# Patient Record
Sex: MALE | Race: WHITE | NOT HISPANIC OR LATINO | URBAN - METROPOLITAN AREA
[De-identification: names, ages, dates, MRNs, and addresses within clinical notes are randomized per-mention and may not be internally consistent; named-entity substitution may affect disease eponyms.]

---

## 2021-03-03 DIAGNOSIS — Z23 NEED FOR VACCINATION: ICD-10-CM

## 2021-11-10 ENCOUNTER — HOSPITAL ENCOUNTER (INPATIENT)
Facility: MEDICAL CENTER | Age: 66
LOS: 4 days | DRG: 871 | End: 2021-11-14
Attending: EMERGENCY MEDICINE | Admitting: HOSPITALIST
Payer: COMMERCIAL

## 2021-11-10 ENCOUNTER — APPOINTMENT (OUTPATIENT)
Dept: RADIOLOGY | Facility: MEDICAL CENTER | Age: 66
DRG: 871 | End: 2021-11-10
Attending: EMERGENCY MEDICINE
Payer: COMMERCIAL

## 2021-11-10 ENCOUNTER — APPOINTMENT (OUTPATIENT)
Dept: RADIOLOGY | Facility: MEDICAL CENTER | Age: 66
DRG: 871 | End: 2021-11-10
Attending: STUDENT IN AN ORGANIZED HEALTH CARE EDUCATION/TRAINING PROGRAM
Payer: COMMERCIAL

## 2021-11-10 DIAGNOSIS — E87.20 LACTIC ACIDOSIS: ICD-10-CM

## 2021-11-10 DIAGNOSIS — A41.9 SEPSIS, DUE TO UNSPECIFIED ORGANISM, UNSPECIFIED WHETHER ACUTE ORGAN DYSFUNCTION PRESENT (HCC): ICD-10-CM

## 2021-11-10 DIAGNOSIS — R79.89 ELEVATED TROPONIN: ICD-10-CM

## 2021-11-10 DIAGNOSIS — I45.10 RBBB: ICD-10-CM

## 2021-11-10 DIAGNOSIS — R73.9 HYPERGLYCEMIA: ICD-10-CM

## 2021-11-10 DIAGNOSIS — R00.0 TACHYCARDIA: ICD-10-CM

## 2021-11-10 PROBLEM — E11.65 TYPE 2 DIABETES MELLITUS WITH HYPERGLYCEMIA, WITHOUT LONG-TERM CURRENT USE OF INSULIN (HCC): Status: ACTIVE | Noted: 2021-11-10

## 2021-11-10 PROBLEM — E27.8 ADRENAL CYST (HCC): Status: ACTIVE | Noted: 2021-11-10

## 2021-11-10 PROBLEM — J44.9 COPD (CHRONIC OBSTRUCTIVE PULMONARY DISEASE) (HCC): Status: ACTIVE | Noted: 2021-11-10

## 2021-11-10 PROBLEM — F17.200 TOBACCO USE DISORDER: Status: ACTIVE | Noted: 2021-11-10

## 2021-11-10 PROBLEM — N28.1 RENAL CYST: Status: ACTIVE | Noted: 2021-11-10

## 2021-11-10 PROBLEM — J96.00 ACUTE RESPIRATORY FAILURE (HCC): Status: ACTIVE | Noted: 2021-11-10

## 2021-11-10 LAB
ALBUMIN SERPL BCP-MCNC: 4.5 G/DL (ref 3.2–4.9)
ALBUMIN/GLOB SERPL: 1.8 G/DL
ALP SERPL-CCNC: 89 U/L (ref 30–99)
ALT SERPL-CCNC: 27 U/L (ref 2–50)
ANION GAP SERPL CALC-SCNC: 18 MMOL/L (ref 7–16)
APPEARANCE UR: CLEAR
AST SERPL-CCNC: 20 U/L (ref 12–45)
BACTERIA #/AREA URNS HPF: ABNORMAL /HPF
BASOPHILS # BLD AUTO: 0.3 % (ref 0–1.8)
BASOPHILS # BLD: 0.08 K/UL (ref 0–0.12)
BILIRUB SERPL-MCNC: 0.8 MG/DL (ref 0.1–1.5)
BILIRUB UR QL STRIP.AUTO: NEGATIVE
BUN SERPL-MCNC: 20 MG/DL (ref 8–22)
CALCIUM SERPL-MCNC: 9.1 MG/DL (ref 8.5–10.5)
CHLORIDE SERPL-SCNC: 98 MMOL/L (ref 96–112)
CO2 SERPL-SCNC: 19 MMOL/L (ref 20–33)
COLOR UR: YELLOW
CORTIS SERPL-MCNC: 38 UG/DL (ref 0–23)
CREAT SERPL-MCNC: 1.37 MG/DL (ref 0.5–1.4)
EKG IMPRESSION: NORMAL
EOSINOPHIL # BLD AUTO: 0.17 K/UL (ref 0–0.51)
EOSINOPHIL NFR BLD: 0.7 % (ref 0–6.9)
EPI CELLS #/AREA URNS HPF: NEGATIVE /HPF
ERYTHROCYTE [DISTWIDTH] IN BLOOD BY AUTOMATED COUNT: 45 FL (ref 35.9–50)
EST. AVERAGE GLUCOSE BLD GHB EST-MCNC: 197 MG/DL
FLUAV RNA SPEC QL NAA+PROBE: NEGATIVE
FLUBV RNA SPEC QL NAA+PROBE: NEGATIVE
GLOBULIN SER CALC-MCNC: 2.5 G/DL (ref 1.9–3.5)
GLUCOSE BLD-MCNC: 242 MG/DL (ref 65–99)
GLUCOSE BLD-MCNC: 246 MG/DL (ref 65–99)
GLUCOSE SERPL-MCNC: 332 MG/DL (ref 65–99)
GLUCOSE UR STRIP.AUTO-MCNC: 500 MG/DL
HBA1C MFR BLD: 8.5 % (ref 4–5.6)
HCT VFR BLD AUTO: 45.8 % (ref 42–52)
HGB BLD-MCNC: 16.7 G/DL (ref 14–18)
HYALINE CASTS #/AREA URNS LPF: ABNORMAL /LPF
IMM GRANULOCYTES # BLD AUTO: 0.2 K/UL (ref 0–0.11)
IMM GRANULOCYTES NFR BLD AUTO: 0.8 % (ref 0–0.9)
KETONES UR STRIP.AUTO-MCNC: NEGATIVE MG/DL
LACTATE BLD-SCNC: 1.8 MMOL/L (ref 0.5–2)
LACTATE BLD-SCNC: 2.5 MMOL/L (ref 0.5–2)
LACTATE BLD-SCNC: 6.3 MMOL/L (ref 0.5–2)
LEUKOCYTE ESTERASE UR QL STRIP.AUTO: ABNORMAL
LYMPHOCYTES # BLD AUTO: 2.82 K/UL (ref 1–4.8)
LYMPHOCYTES NFR BLD: 11.9 % (ref 22–41)
MCH RBC QN AUTO: 34.3 PG (ref 27–33)
MCHC RBC AUTO-ENTMCNC: 36.5 G/DL (ref 33.7–35.3)
MCV RBC AUTO: 94 FL (ref 81.4–97.8)
MICRO URNS: ABNORMAL
MONOCYTES # BLD AUTO: 0.91 K/UL (ref 0–0.85)
MONOCYTES NFR BLD AUTO: 3.9 % (ref 0–13.4)
NEUTROPHILS # BLD AUTO: 19.45 K/UL (ref 1.82–7.42)
NEUTROPHILS NFR BLD: 82.4 % (ref 44–72)
NITRITE UR QL STRIP.AUTO: NEGATIVE
NRBC # BLD AUTO: 0 K/UL
NRBC BLD-RTO: 0 /100 WBC
NT-PROBNP SERPL IA-MCNC: 79 PG/ML (ref 0–125)
PH UR STRIP.AUTO: 5 [PH] (ref 5–8)
PLATELET # BLD AUTO: 281 K/UL (ref 164–446)
PMV BLD AUTO: 10.1 FL (ref 9–12.9)
POTASSIUM SERPL-SCNC: 4 MMOL/L (ref 3.6–5.5)
PROT SERPL-MCNC: 7 G/DL (ref 6–8.2)
PROT UR QL STRIP: 30 MG/DL
RBC # BLD AUTO: 4.87 M/UL (ref 4.7–6.1)
RBC # URNS HPF: ABNORMAL /HPF
RBC UR QL AUTO: ABNORMAL
RSV RNA SPEC QL NAA+PROBE: NEGATIVE
SARS-COV-2 RNA RESP QL NAA+PROBE: NOTDETECTED
SODIUM SERPL-SCNC: 135 MMOL/L (ref 135–145)
SP GR UR STRIP.AUTO: 1.02
SPECIMEN SOURCE: NORMAL
TROPONIN T SERPL-MCNC: 22 NG/L (ref 6–19)
UROBILINOGEN UR STRIP.AUTO-MCNC: 0.2 MG/DL
WBC # BLD AUTO: 23.6 K/UL (ref 4.8–10.8)
WBC #/AREA URNS HPF: ABNORMAL /HPF

## 2021-11-10 PROCEDURE — 87040 BLOOD CULTURE FOR BACTERIA: CPT | Mod: 91

## 2021-11-10 PROCEDURE — 770020 HCHG ROOM/CARE - TELE (206)

## 2021-11-10 PROCEDURE — 82533 TOTAL CORTISOL: CPT

## 2021-11-10 PROCEDURE — A9270 NON-COVERED ITEM OR SERVICE: HCPCS | Performed by: STUDENT IN AN ORGANIZED HEALTH CARE EDUCATION/TRAINING PROGRAM

## 2021-11-10 PROCEDURE — 700111 HCHG RX REV CODE 636 W/ 250 OVERRIDE (IP): Performed by: EMERGENCY MEDICINE

## 2021-11-10 PROCEDURE — 94660 CPAP INITIATION&MGMT: CPT

## 2021-11-10 PROCEDURE — 83835 ASSAY OF METANEPHRINES: CPT | Mod: 91

## 2021-11-10 PROCEDURE — A9270 NON-COVERED ITEM OR SERVICE: HCPCS | Performed by: EMERGENCY MEDICINE

## 2021-11-10 PROCEDURE — 87077 CULTURE AEROBIC IDENTIFY: CPT

## 2021-11-10 PROCEDURE — 700102 HCHG RX REV CODE 250 W/ 637 OVERRIDE(OP): Performed by: STUDENT IN AN ORGANIZED HEALTH CARE EDUCATION/TRAINING PROGRAM

## 2021-11-10 PROCEDURE — 83880 ASSAY OF NATRIURETIC PEPTIDE: CPT

## 2021-11-10 PROCEDURE — 82962 GLUCOSE BLOOD TEST: CPT | Mod: 91

## 2021-11-10 PROCEDURE — 87186 SC STD MICRODIL/AGAR DIL: CPT

## 2021-11-10 PROCEDURE — 83605 ASSAY OF LACTIC ACID: CPT | Mod: 91

## 2021-11-10 PROCEDURE — 93005 ELECTROCARDIOGRAM TRACING: CPT | Performed by: HOSPITALIST

## 2021-11-10 PROCEDURE — 99221 1ST HOSP IP/OBS SF/LOW 40: CPT | Mod: GC | Performed by: HOSPITALIST

## 2021-11-10 PROCEDURE — 93010 ELECTROCARDIOGRAM REPORT: CPT | Performed by: INTERNAL MEDICINE

## 2021-11-10 PROCEDURE — 85025 COMPLETE CBC W/AUTO DIFF WBC: CPT

## 2021-11-10 PROCEDURE — A9270 NON-COVERED ITEM OR SERVICE: HCPCS | Performed by: HOSPITALIST

## 2021-11-10 PROCEDURE — 0241U HCHG SARS-COV-2 COVID-19 NFCT DS RESP RNA 4 TRGT MIC: CPT

## 2021-11-10 PROCEDURE — 83036 HEMOGLOBIN GLYCOSYLATED A1C: CPT

## 2021-11-10 PROCEDURE — 700102 HCHG RX REV CODE 250 W/ 637 OVERRIDE(OP)

## 2021-11-10 PROCEDURE — 700105 HCHG RX REV CODE 258: Performed by: EMERGENCY MEDICINE

## 2021-11-10 PROCEDURE — 71045 X-RAY EXAM CHEST 1 VIEW: CPT

## 2021-11-10 PROCEDURE — 84484 ASSAY OF TROPONIN QUANT: CPT

## 2021-11-10 PROCEDURE — 700105 HCHG RX REV CODE 258: Performed by: STUDENT IN AN ORGANIZED HEALTH CARE EDUCATION/TRAINING PROGRAM

## 2021-11-10 PROCEDURE — 82384 ASSAY THREE CATECHOLAMINES: CPT | Mod: 91

## 2021-11-10 PROCEDURE — 87086 URINE CULTURE/COLONY COUNT: CPT

## 2021-11-10 PROCEDURE — 700111 HCHG RX REV CODE 636 W/ 250 OVERRIDE (IP): Performed by: STUDENT IN AN ORGANIZED HEALTH CARE EDUCATION/TRAINING PROGRAM

## 2021-11-10 PROCEDURE — 700102 HCHG RX REV CODE 250 W/ 637 OVERRIDE(OP): Performed by: HOSPITALIST

## 2021-11-10 PROCEDURE — 99285 EMERGENCY DEPT VISIT HI MDM: CPT

## 2021-11-10 PROCEDURE — 700117 HCHG RX CONTRAST REV CODE 255: Performed by: EMERGENCY MEDICINE

## 2021-11-10 PROCEDURE — 96365 THER/PROPH/DIAG IV INF INIT: CPT

## 2021-11-10 PROCEDURE — C9803 HOPD COVID-19 SPEC COLLECT: HCPCS | Performed by: EMERGENCY MEDICINE

## 2021-11-10 PROCEDURE — 36415 COLL VENOUS BLD VENIPUNCTURE: CPT

## 2021-11-10 PROCEDURE — 81001 URINALYSIS AUTO W/SCOPE: CPT

## 2021-11-10 PROCEDURE — 74175 CTA ABDOMEN W/CONTRAST: CPT

## 2021-11-10 PROCEDURE — 700102 HCHG RX REV CODE 250 W/ 637 OVERRIDE(OP): Performed by: EMERGENCY MEDICINE

## 2021-11-10 PROCEDURE — 96375 TX/PRO/DX INJ NEW DRUG ADDON: CPT

## 2021-11-10 PROCEDURE — 80053 COMPREHEN METABOLIC PANEL: CPT

## 2021-11-10 PROCEDURE — 70450 CT HEAD/BRAIN W/O DYE: CPT

## 2021-11-10 RX ORDER — BUDESONIDE AND FORMOTEROL FUMARATE DIHYDRATE 80; 4.5 UG/1; UG/1
2 AEROSOL RESPIRATORY (INHALATION) 2 TIMES DAILY
Status: DISCONTINUED | OUTPATIENT
Start: 2021-11-10 | End: 2021-11-14 | Stop reason: HOSPADM

## 2021-11-10 RX ORDER — BUDESONIDE AND FORMOTEROL FUMARATE DIHYDRATE 80; 4.5 UG/1; UG/1
2 AEROSOL RESPIRATORY (INHALATION) 2 TIMES DAILY
COMMUNITY

## 2021-11-10 RX ORDER — SODIUM CHLORIDE 9 MG/ML
1000 INJECTION, SOLUTION INTRAVENOUS ONCE
Status: COMPLETED | OUTPATIENT
Start: 2021-11-10 | End: 2021-11-10

## 2021-11-10 RX ORDER — HYDROCHLOROTHIAZIDE 12.5 MG/1
12.5 TABLET ORAL DAILY
Status: ON HOLD | COMMUNITY
End: 2021-11-14

## 2021-11-10 RX ORDER — POLYETHYLENE GLYCOL 3350 17 G/17G
1 POWDER, FOR SOLUTION ORAL
Status: DISCONTINUED | OUTPATIENT
Start: 2021-11-10 | End: 2021-11-14 | Stop reason: HOSPADM

## 2021-11-10 RX ORDER — INSULIN LISPRO 100 [IU]/ML
1-6 INJECTION, SOLUTION INTRAVENOUS; SUBCUTANEOUS EVERY 6 HOURS
Status: DISCONTINUED | OUTPATIENT
Start: 2021-11-10 | End: 2021-11-13

## 2021-11-10 RX ORDER — ONDANSETRON 2 MG/ML
4 INJECTION INTRAMUSCULAR; INTRAVENOUS EVERY 4 HOURS PRN
Status: DISCONTINUED | OUTPATIENT
Start: 2021-11-10 | End: 2021-11-14 | Stop reason: HOSPADM

## 2021-11-10 RX ORDER — ACETAMINOPHEN 325 MG/1
650 TABLET ORAL EVERY 4 HOURS PRN
Status: DISCONTINUED | OUTPATIENT
Start: 2021-11-10 | End: 2021-11-11

## 2021-11-10 RX ORDER — LISINOPRIL 20 MG/1
20 TABLET ORAL 2 TIMES DAILY
Status: ON HOLD | COMMUNITY
End: 2021-11-14

## 2021-11-10 RX ORDER — NICOTINE 21 MG/24HR
21 PATCH, TRANSDERMAL 24 HOURS TRANSDERMAL
Status: DISCONTINUED | OUTPATIENT
Start: 2021-11-10 | End: 2021-11-14 | Stop reason: HOSPADM

## 2021-11-10 RX ORDER — BISACODYL 10 MG
10 SUPPOSITORY, RECTAL RECTAL
Status: DISCONTINUED | OUTPATIENT
Start: 2021-11-10 | End: 2021-11-14 | Stop reason: HOSPADM

## 2021-11-10 RX ORDER — AMLODIPINE BESYLATE 10 MG/1
10 TABLET ORAL DAILY
COMMUNITY

## 2021-11-10 RX ORDER — LEVOTHYROXINE SODIUM 0.2 MG/1
200 TABLET ORAL
Status: DISCONTINUED | OUTPATIENT
Start: 2021-11-11 | End: 2021-11-14 | Stop reason: HOSPADM

## 2021-11-10 RX ORDER — ACETAMINOPHEN 500 MG
1000 TABLET ORAL ONCE
Status: COMPLETED | OUTPATIENT
Start: 2021-11-10 | End: 2021-11-10

## 2021-11-10 RX ORDER — DOXYCYCLINE 100 MG/1
100 TABLET ORAL EVERY 12 HOURS
Status: DISCONTINUED | OUTPATIENT
Start: 2021-11-10 | End: 2021-11-11

## 2021-11-10 RX ORDER — METOPROLOL TARTRATE 1 MG/ML
5 INJECTION, SOLUTION INTRAVENOUS
Status: DISCONTINUED | OUTPATIENT
Start: 2021-11-10 | End: 2021-11-14

## 2021-11-10 RX ORDER — ONDANSETRON 4 MG/1
4 TABLET, ORALLY DISINTEGRATING ORAL EVERY 4 HOURS PRN
Status: DISCONTINUED | OUTPATIENT
Start: 2021-11-10 | End: 2021-11-14 | Stop reason: HOSPADM

## 2021-11-10 RX ORDER — CEFTRIAXONE 2 G/1
2 INJECTION, POWDER, FOR SOLUTION INTRAMUSCULAR; INTRAVENOUS ONCE
Status: COMPLETED | OUTPATIENT
Start: 2021-11-10 | End: 2021-11-10

## 2021-11-10 RX ORDER — DEXTROSE MONOHYDRATE 25 G/50ML
50 INJECTION, SOLUTION INTRAVENOUS
Status: DISCONTINUED | OUTPATIENT
Start: 2021-11-10 | End: 2021-11-13

## 2021-11-10 RX ORDER — ALBUTEROL SULFATE 90 UG/1
2 AEROSOL, METERED RESPIRATORY (INHALATION) EVERY 6 HOURS PRN
COMMUNITY

## 2021-11-10 RX ORDER — AMOXICILLIN 250 MG
2 CAPSULE ORAL 2 TIMES DAILY
Status: DISCONTINUED | OUTPATIENT
Start: 2021-11-11 | End: 2021-11-14 | Stop reason: HOSPADM

## 2021-11-10 RX ORDER — IBUPROFEN 600 MG/1
600 TABLET ORAL ONCE
Status: COMPLETED | OUTPATIENT
Start: 2021-11-10 | End: 2021-11-10

## 2021-11-10 RX ORDER — AMLODIPINE BESYLATE 10 MG/1
10 TABLET ORAL DAILY
Status: DISCONTINUED | OUTPATIENT
Start: 2021-11-11 | End: 2021-11-14 | Stop reason: HOSPADM

## 2021-11-10 RX ORDER — ALBUTEROL SULFATE 90 UG/1
2 AEROSOL, METERED RESPIRATORY (INHALATION) EVERY 6 HOURS PRN
Status: DISCONTINUED | OUTPATIENT
Start: 2021-11-10 | End: 2021-11-14 | Stop reason: HOSPADM

## 2021-11-10 RX ORDER — LEVOTHYROXINE SODIUM 0.2 MG/1
200 TABLET ORAL
COMMUNITY

## 2021-11-10 RX ADMIN — SODIUM CHLORIDE 3 G: 900 INJECTION INTRAVENOUS at 13:14

## 2021-11-10 RX ADMIN — ACETAMINOPHEN 1000 MG: 500 TABLET ORAL at 08:18

## 2021-11-10 RX ADMIN — ENOXAPARIN SODIUM 40 MG: 40 INJECTION SUBCUTANEOUS at 17:49

## 2021-11-10 RX ADMIN — ACETAMINOPHEN 1000 MG: 500 TABLET ORAL at 14:07

## 2021-11-10 RX ADMIN — SODIUM CHLORIDE 1000 ML: 9 INJECTION, SOLUTION INTRAVENOUS at 08:18

## 2021-11-10 RX ADMIN — SODIUM CHLORIDE 1000 ML: 9 INJECTION, SOLUTION INTRAVENOUS at 20:05

## 2021-11-10 RX ADMIN — CEFTRIAXONE SODIUM 2 G: 2 INJECTION, POWDER, FOR SOLUTION INTRAMUSCULAR; INTRAVENOUS at 08:19

## 2021-11-10 RX ADMIN — INSULIN GLARGINE 10 UNITS: 100 INJECTION, SOLUTION SUBCUTANEOUS at 17:55

## 2021-11-10 RX ADMIN — BUDESONIDE AND FORMOTEROL FUMARATE DIHYDRATE 2 PUFF: 80; 4.5 AEROSOL RESPIRATORY (INHALATION) at 18:01

## 2021-11-10 RX ADMIN — ACETAMINOPHEN 650 MG: 325 TABLET, FILM COATED ORAL at 17:48

## 2021-11-10 RX ADMIN — DOXYCYCLINE 100 MG: 100 TABLET, FILM COATED ORAL at 17:48

## 2021-11-10 RX ADMIN — SODIUM CHLORIDE 1000 ML: 9 INJECTION, SOLUTION INTRAVENOUS at 06:45

## 2021-11-10 RX ADMIN — SODIUM CHLORIDE 1000 ML: 9 INJECTION, SOLUTION INTRAVENOUS at 07:51

## 2021-11-10 RX ADMIN — ACETAMINOPHEN 650 MG: 325 TABLET, FILM COATED ORAL at 21:48

## 2021-11-10 RX ADMIN — SODIUM CHLORIDE 3 G: 900 INJECTION INTRAVENOUS at 17:56

## 2021-11-10 RX ADMIN — IBUPROFEN 600 MG: 600 TABLET ORAL at 08:18

## 2021-11-10 RX ADMIN — IOHEXOL 100 ML: 350 INJECTION, SOLUTION INTRAVENOUS at 07:39

## 2021-11-10 RX ADMIN — METOPROLOL TARTRATE 25 MG: 25 TABLET, FILM COATED ORAL at 21:24

## 2021-11-10 ASSESSMENT — COGNITIVE AND FUNCTIONAL STATUS - GENERAL
DAILY ACTIVITIY SCORE: 24
SUGGESTED CMS G CODE MODIFIER MOBILITY: CJ
STANDING UP FROM CHAIR USING ARMS: A LITTLE
MOBILITY SCORE: 21
WALKING IN HOSPITAL ROOM: A LITTLE
SUGGESTED CMS G CODE MODIFIER DAILY ACTIVITY: CH
CLIMB 3 TO 5 STEPS WITH RAILING: A LITTLE

## 2021-11-10 ASSESSMENT — FIBROSIS 4 INDEX
FIB4 SCORE: 0.9
FIB4 SCORE: 0.9

## 2021-11-10 ASSESSMENT — PAIN SCALES - WONG BAKER
WONGBAKER_NUMERICALRESPONSE: DOESN'T HURT AT ALL
WONGBAKER_NUMERICALRESPONSE: HURTS EVEN MORE

## 2021-11-10 NOTE — ED NOTES
Med Rec completed: per pt and phone call to home pharmacy.     No ORAL antibiotics in last 30 days    Preferred Pharmacy: [facility]     Pt confirmed following allergies:  No Known Allergies     Pt's home medications:   Medication Sig   • albuterol 108 (90 Base) MCG/ACT Aero Soln inhalation aerosol Inhale 2 Puffs every 6 hours as needed for Shortness of Breath.   • amLODIPine (NORVASC) 10 MG Tab Take 10 mg by mouth every day.   • hydroCHLOROthiazide (HYDRODIURIL) 12.5 MG tablet Take 12.5 mg by mouth every day.   • levothyroxine (SYNTHROID) 200 MCG Tab Take 200 mcg by mouth every morning on an empty stomach.   • metFORMIN (GLUCOPHAGE) 500 MG Tab Take 2,000 mg by mouth every morning.   • lisinopril (PRINIVIL) 20 MG Tab Take 20 mg by mouth 2 times a day.   • budesonide-formoterol (SYMBICORT) 80-4.5 MCG/ACT Aerosol Inhale 2 Puffs 2 times a day.

## 2021-11-10 NOTE — DISCHARGE PLANNING
Medical Social Work    Referral: STEMI    Intervention: Pt is a 66 year old male brought in by NIMESH from a gas station for weakness and possible STEMI.  Pt is Vazquez Beckwith (: 1955).  Per medics the  was attempting to get a hold of pt's wife.  MSW contacted pt's wife, Allyson ([phone]) who was advised of pt's arrival.  Pt's wife was unaware.  Pt's wife will be coming in shortly; pt updated.  Pt will not be going to cath lab at this time per Cardiology.    Plan: SW will remain available.

## 2021-11-10 NOTE — ED PROVIDER NOTES
ED Provider Note    Scribed for Geronimo Grace M.D. by Giorgi Shane. 11/10/2021  6:16 AM    Primary care provider: No primary care provider on file.  Means of arrival: EMS  History obtained from: EMS and Patient  History limited by: None    CHIEF COMPLAINT  Chief Complaint   Patient presents with    Near Syncopal     Pt BIB EMS as code Stemi       HPI  Nitin Arevalo is a 66 y.o.  male who presents to the Emergency Department for evaluation of a STEMI activation. Per EMS, the patient was found near a gas station about 2 hours ago. The patient states he had full-body, uncontrollable shaking while trying to get out of his truck. He has associated exhaustion,diaphoresis, and hip pain and denies fevers, nausea, or vomiting. The patient states his hip pain started a couple weeks ago. He  Has received his COVID vaccine.    REVIEW OF SYSTEMS  Pertinent positives include full-body shaking,exhaustion,diaphoesis, and hip pain.   Pertinent negatives include no fevers, nausea, or vomiting.    All other systems reviewed and negative. See HPI for further details.       PAST MEDICAL HISTORY   has a past medical history of Chronic obstructive pulmonary disease (HCC), Diabetes (HCC), and Hypertension.None noted    SURGICAL HISTORY  patient denies any surgical history    SOCIAL HISTORY  Social History     Tobacco Use    Smoking status: Current Every Day Smoker    Smokeless tobacco: Never Used   Substance Use Topics    Alcohol use: Yes    Drug use: Not Currently      Social History     Substance and Sexual Activity   Drug Use Not Currently       FAMILY HISTORY  History reviewed. No pertinent family history.    CURRENT MEDICATIONS  Home Medications       Reviewed by Eliz Crenshaw (Pharmacy Tech) on 11/10/21 at 0715  Med List Status: Complete     Medication Last Dose Status   albuterol 108 (90 Base) MCG/ACT Aero Soln inhalation aerosol prn Active   amLODIPine (NORVASC) 10 MG Tab 11/10/2021 Active   budesonide-formoterol  (SYMBICORT) 80-4.5 MCG/ACT Aerosol 11/9/2021 Active   hydroCHLOROthiazide (HYDRODIURIL) 12.5 MG tablet 11/10/2021 Active   levothyroxine (SYNTHROID) 200 MCG Tab 11/10/2021 Active   lisinopril (PRINIVIL) 20 MG Tab 11/10/2021 Active   metFORMIN (GLUCOPHAGE) 500 MG Tab 11/9/2021 Active                    ALLERGIES  No Known Allergies    PHYSICAL EXAM  VITAL SIGNS: /59   Pulse (!) 121   Temp (!) 38.3 °C (101 °F) (Temporal)   Resp (!) 28   SpO2 98%     Nursing note and vitals reviewed.  Constitutional: Well-developed and well-nourished. Severe distress. Face is flushed. Diaphoretic. Incontinent of stool.  Neck: No meningismus.  HENT: Head is normocephalic and atraumatic. Oropharynx is clear and moist without exudate or erythema.   Eyes: Pupils are equal, round, and reactive to light. Conjunctiva are normal.   Cardiovascular: Tachycardic rate and regular rhythm. No murmur heard. Normal radial pulses.  Pulmonary/Chest: Breath sounds normal. No wheezes or rales.   Abdominal: Soft and non-tender. No distention    Musculoskeletal: Extremities exhibit normal range of motion without edema or tenderness.   Neurological: Awake, alert and oriented to person, place, and time. No focal deficits noted.  Skin: Skin is warm and dry. No rash.   Psychiatric: Normal mood and affect. Appropriate for clinical situation.    DIAGNOSTIC STUDIES / PROCEDURES    LABS  Results for orders placed or performed during the hospital encounter of 11/10/21   LACTIC ACID   Result Value Ref Range    Lactic Acid 6.3 (HH) 0.5 - 2.0 mmol/L   CBC WITH DIFFERENTIAL   Result Value Ref Range    WBC 23.6 (H) 4.8 - 10.8 K/uL    RBC 4.87 4.70 - 6.10 M/uL    Hemoglobin 16.7 14.0 - 18.0 g/dL    Hematocrit 45.8 42.0 - 52.0 %    MCV 94.0 81.4 - 97.8 fL    MCH 34.3 (H) 27.0 - 33.0 pg    MCHC 36.5 (H) 33.7 - 35.3 g/dL    RDW 45.0 35.9 - 50.0 fL    Platelet Count 281 164 - 446 K/uL    MPV 10.1 9.0 - 12.9 fL    Neutrophils-Polys 82.40 (H) 44.00 - 72.00 %     Lymphocytes 11.90 (L) 22.00 - 41.00 %    Monocytes 3.90 0.00 - 13.40 %    Eosinophils 0.70 0.00 - 6.90 %    Basophils 0.30 0.00 - 1.80 %    Immature Granulocytes 0.80 0.00 - 0.90 %    Nucleated RBC 0.00 /100 WBC    Neutrophils (Absolute) 19.45 (H) 1.82 - 7.42 K/uL    Lymphs (Absolute) 2.82 1.00 - 4.80 K/uL    Monos (Absolute) 0.91 (H) 0.00 - 0.85 K/uL    Eos (Absolute) 0.17 0.00 - 0.51 K/uL    Baso (Absolute) 0.08 0.00 - 0.12 K/uL    Immature Granulocytes (abs) 0.20 (H) 0.00 - 0.11 K/uL    NRBC (Absolute) 0.00 K/uL   COMP METABOLIC PANEL   Result Value Ref Range    Sodium 135 135 - 145 mmol/L    Potassium 4.0 3.6 - 5.5 mmol/L    Chloride 98 96 - 112 mmol/L    Co2 19 (L) 20 - 33 mmol/L    Anion Gap 18.0 (H) 7.0 - 16.0    Glucose 332 (H) 65 - 99 mg/dL    Bun 20 8 - 22 mg/dL    Creatinine 1.37 0.50 - 1.40 mg/dL    Calcium 9.1 8.5 - 10.5 mg/dL    AST(SGOT) 20 12 - 45 U/L    ALT(SGPT) 27 2 - 50 U/L    Alkaline Phosphatase 89 30 - 99 U/L    Total Bilirubin 0.8 0.1 - 1.5 mg/dL    Albumin 4.5 3.2 - 4.9 g/dL    Total Protein 7.0 6.0 - 8.2 g/dL    Globulin 2.5 1.9 - 3.5 g/dL    A-G Ratio 1.8 g/dL   URINALYSIS    Specimen: Urine, Clean Catch   Result Value Ref Range    Micro Urine Req Microscopic    TROPONIN   Result Value Ref Range    Troponin T 22 (H) 6 - 19 ng/L   proBrain Natriuretic Peptide, NT   Result Value Ref Range    NT-proBNP 79 0 - 125 pg/mL   ESTIMATED GFR   Result Value Ref Range    GFR If African American >60 >60 mL/min/1.73 m 2    GFR If Non African American 52 (A) >60 mL/min/1.73 m 2       All labs reviewed by me.    RADIOLOGY  CT-CTA COMPLETE THORACOABDOMINAL AORTA   Final Result         1.  No aortic aneurysm or dissection identified.   2.  Indeterminate bilateral renal lesions, could represent hemorrhagic cyst, recommend follow-up contrast-enhanced MRI of the kidneys for further characterization.   3.  Indeterminate left adrenal nodule, could be further evaluated with adrenal protocol CT or MRI of the  adrenal glands for further characterization.   4.  Linear densities of the left lung base favors changes of atelectasis.   5.  Diverticulosis   6.  Hepatomegaly   7.  Atherosclerosis and atherosclerotic coronary artery disease      DX-CHEST-PORTABLE (1 VIEW)   Final Result         1.  Left basilar atelectasis or early infiltrate.   2.  Trace left pleural effusion        The radiologist's interpretation of all radiological studies have been reviewed by me.    COURSE & MEDICAL DECISION MAKING  Nursing notes, VS, PMSFHx reviewed in chart.     6:16 AM - Patient seen and examined at bedside. Patient will be treated with NS 1,000 mL. Intravenous fluids were administered for tachycardia.  Ordered DX-Chest, Lactic Acid now and every two hours, pBNP-NT, CBC w/diff, CMP,  UA, Urine Culture, Blood Culture, Troponin to evaluate his symptoms. The patient was brought in as a STEMI activation, however EKG reveals a right bundle branch block.    6:26 AM - Met with the cardiologist in the trauma room. Cancelled STEMI and both agreed the patient requires medical evaluation.    6:54 AM - Ordered CT-CTA Complete Thoracoabdominal Aorta.    7:00 AM - On repeat evaluation, patient was found asleep in bed. His heart rate is 199 and blood pressure is 133/59.    7:56 AM - On repeat evaluation, patient will be treated with NS 1,000 mL, Rocephin 2 g, Tylenol 1,000mg, and Motrin 600 mg. Intravenous fluids were administered for tachycardia.    8:04 AM - Paged hospitalist.    8:13 AM - I discussed the patient's case and the above findings with Dr. Blair ([facility] Internal Med Resident) who agreed to evaluate the patient for admission to the hospital.    Patient presents today acutely ill appearing.  Laboratory studies demonstrates a lactic acidosis.  Elevated white blood cell count.  Patient meets criteria for sepsis of unclear source.  Ceftriaxone initiated in the emergency department.  Patient is going to be admitted for further evaluation and  treatment.    HYDRATION: Based on the patient's presentation of Tachycardia the patient was given IV fluids. IV Hydration was used because oral hydration was not adequate alone. Upon recheck following hydration, the patient was improved.    DISPOSITION:  Patient will be hospitalized by Dr. Blair in guarded condition.      FINAL IMPRESSION  1. Sepsis, due to unspecified organism, unspecified whether acute organ dysfunction present (HCC)    2. Lactic acidosis    3. Tachycardia    4. RBBB    5. Hyperglycemia    6. Elevated troponin          I, Giorgi Shane (Scribe), am scribing for, and in the presence of, Geronimo Grace M.D..    Electronically signed by: Giorgi Shane (Scribe), 11/10/2021    I, Geronimo Grace M.D. personally performed the services described in this documentation, as scribed by Giorgi Shane in my presence, and it is both accurate and complete.    The note accurately reflects work and decisions made by me.  Geronimo Grace M.D.  11/10/2021  11:49 AM    C

## 2021-11-10 NOTE — ED TRIAGE NOTES
Chief Complaint   Patient presents with   • Near Syncopal     Pt BIB EMS as code Stemi     Pt BIB EMS as Stemi alert, Pt found down w/ near syncopal episode, acute onset hip pain with dizziness feeling. Pt reports hx lower back pain. Pt arrived diaphoretic, tachypnea. Pt denies CP/abd pain. GCS 15, a/o x 4.     /59   Pulse (!) 121   Resp (!) 28   SpO2 98%

## 2021-11-10 NOTE — PROGRESS NOTES
Received pt from ED. Patient A&O x 4. Sinus tach on telemetry monitor. Currently requiring 2 L via NC will titrate off as tolerated. Lungs diminished throughout.  Complains of right hip pain will medicate per MAR. POC discussed with patient. Pt verbalizes understanding. Call light and belongings with in reach. Bed locked and in lowest position, alarm and fall precautions in place.

## 2021-11-10 NOTE — ED NOTES
Lab called with critical result of Lactic 6.3 at 0635.    Dr. Grace notified of critical lab result at 0637.

## 2021-11-11 ENCOUNTER — APPOINTMENT (OUTPATIENT)
Dept: CARDIOLOGY | Facility: MEDICAL CENTER | Age: 66
DRG: 871 | End: 2021-11-11
Attending: STUDENT IN AN ORGANIZED HEALTH CARE EDUCATION/TRAINING PROGRAM
Payer: COMMERCIAL

## 2021-11-11 LAB
ALBUMIN SERPL BCP-MCNC: 3.4 G/DL (ref 3.2–4.9)
ALBUMIN/GLOB SERPL: 1.2 G/DL
ALP SERPL-CCNC: 66 U/L (ref 30–99)
ALT SERPL-CCNC: 27 U/L (ref 2–50)
ANION GAP SERPL CALC-SCNC: 10 MMOL/L (ref 7–16)
AST SERPL-CCNC: 53 U/L (ref 12–45)
BASOPHILS # BLD AUTO: 0.5 % (ref 0–1.8)
BASOPHILS # BLD: 0.12 K/UL (ref 0–0.12)
BILIRUB SERPL-MCNC: 0.8 MG/DL (ref 0.1–1.5)
BUN SERPL-MCNC: 14 MG/DL (ref 8–22)
CALCIUM SERPL-MCNC: 8.3 MG/DL (ref 8.5–10.5)
CHLORIDE SERPL-SCNC: 101 MMOL/L (ref 96–112)
CO2 SERPL-SCNC: 24 MMOL/L (ref 20–33)
CREAT SERPL-MCNC: 1.23 MG/DL (ref 0.5–1.4)
EOSINOPHIL # BLD AUTO: 0 K/UL (ref 0–0.51)
EOSINOPHIL NFR BLD: 0 % (ref 0–6.9)
ERYTHROCYTE [DISTWIDTH] IN BLOOD BY AUTOMATED COUNT: 47.1 FL (ref 35.9–50)
GLOBULIN SER CALC-MCNC: 2.8 G/DL (ref 1.9–3.5)
GLUCOSE BLD-MCNC: 157 MG/DL (ref 65–99)
GLUCOSE BLD-MCNC: 159 MG/DL (ref 65–99)
GLUCOSE BLD-MCNC: 195 MG/DL (ref 65–99)
GLUCOSE BLD-MCNC: 204 MG/DL (ref 65–99)
GLUCOSE SERPL-MCNC: 205 MG/DL (ref 65–99)
HCT VFR BLD AUTO: 41.1 % (ref 42–52)
HGB BLD-MCNC: 14.3 G/DL (ref 14–18)
IMM GRANULOCYTES # BLD AUTO: 0.17 K/UL (ref 0–0.11)
IMM GRANULOCYTES NFR BLD AUTO: 0.7 % (ref 0–0.9)
LYMPHOCYTES # BLD AUTO: 1.34 K/UL (ref 1–4.8)
LYMPHOCYTES NFR BLD: 5.9 % (ref 22–41)
MCH RBC QN AUTO: 33.4 PG (ref 27–33)
MCHC RBC AUTO-ENTMCNC: 34.8 G/DL (ref 33.7–35.3)
MCV RBC AUTO: 96 FL (ref 81.4–97.8)
MONOCYTES # BLD AUTO: 0.84 K/UL (ref 0–0.85)
MONOCYTES NFR BLD AUTO: 3.7 % (ref 0–13.4)
NEUTROPHILS # BLD AUTO: 20.23 K/UL (ref 1.82–7.42)
NEUTROPHILS NFR BLD: 89.2 % (ref 44–72)
NRBC # BLD AUTO: 0 K/UL
NRBC BLD-RTO: 0 /100 WBC
PLATELET # BLD AUTO: 199 K/UL (ref 164–446)
PMV BLD AUTO: 10.5 FL (ref 9–12.9)
POTASSIUM SERPL-SCNC: 3.9 MMOL/L (ref 3.6–5.5)
PROT SERPL-MCNC: 6.2 G/DL (ref 6–8.2)
RBC # BLD AUTO: 4.28 M/UL (ref 4.7–6.1)
SODIUM SERPL-SCNC: 135 MMOL/L (ref 135–145)
WBC # BLD AUTO: 22.7 K/UL (ref 4.8–10.8)

## 2021-11-11 PROCEDURE — 700111 HCHG RX REV CODE 636 W/ 250 OVERRIDE (IP): Performed by: STUDENT IN AN ORGANIZED HEALTH CARE EDUCATION/TRAINING PROGRAM

## 2021-11-11 PROCEDURE — 85025 COMPLETE CBC W/AUTO DIFF WBC: CPT

## 2021-11-11 PROCEDURE — A9270 NON-COVERED ITEM OR SERVICE: HCPCS | Performed by: STUDENT IN AN ORGANIZED HEALTH CARE EDUCATION/TRAINING PROGRAM

## 2021-11-11 PROCEDURE — 99406 BEHAV CHNG SMOKING 3-10 MIN: CPT

## 2021-11-11 PROCEDURE — 770020 HCHG ROOM/CARE - TELE (206)

## 2021-11-11 PROCEDURE — 80053 COMPREHEN METABOLIC PANEL: CPT

## 2021-11-11 PROCEDURE — 94660 CPAP INITIATION&MGMT: CPT

## 2021-11-11 PROCEDURE — 700105 HCHG RX REV CODE 258: Performed by: STUDENT IN AN ORGANIZED HEALTH CARE EDUCATION/TRAINING PROGRAM

## 2021-11-11 PROCEDURE — 93306 TTE W/DOPPLER COMPLETE: CPT

## 2021-11-11 PROCEDURE — 700102 HCHG RX REV CODE 250 W/ 637 OVERRIDE(OP): Performed by: STUDENT IN AN ORGANIZED HEALTH CARE EDUCATION/TRAINING PROGRAM

## 2021-11-11 PROCEDURE — 82962 GLUCOSE BLOOD TEST: CPT | Mod: 91

## 2021-11-11 PROCEDURE — 99233 SBSQ HOSP IP/OBS HIGH 50: CPT | Mod: GC | Performed by: HOSPITALIST

## 2021-11-11 PROCEDURE — 36415 COLL VENOUS BLD VENIPUNCTURE: CPT

## 2021-11-11 PROCEDURE — 94664 DEMO&/EVAL PT USE INHALER: CPT

## 2021-11-11 RX ORDER — ACETAMINOPHEN 500 MG
1000 TABLET ORAL EVERY 8 HOURS
Status: DISCONTINUED | OUTPATIENT
Start: 2021-11-11 | End: 2021-11-14 | Stop reason: HOSPADM

## 2021-11-11 RX ORDER — ACETAMINOPHEN 325 MG/1
650 TABLET ORAL EVERY 4 HOURS PRN
Status: DISCONTINUED | OUTPATIENT
Start: 2021-11-11 | End: 2021-11-14 | Stop reason: HOSPADM

## 2021-11-11 RX ORDER — SODIUM CHLORIDE, SODIUM LACTATE, POTASSIUM CHLORIDE, CALCIUM CHLORIDE 600; 310; 30; 20 MG/100ML; MG/100ML; MG/100ML; MG/100ML
INJECTION, SOLUTION INTRAVENOUS CONTINUOUS
Status: DISCONTINUED | OUTPATIENT
Start: 2021-11-11 | End: 2021-11-12

## 2021-11-11 RX ADMIN — BUDESONIDE AND FORMOTEROL FUMARATE DIHYDRATE 2 PUFF: 80; 4.5 AEROSOL RESPIRATORY (INHALATION) at 17:09

## 2021-11-11 RX ADMIN — INSULIN LISPRO 2 UNITS: 100 INJECTION, SOLUTION INTRAVENOUS; SUBCUTANEOUS at 05:55

## 2021-11-11 RX ADMIN — INSULIN LISPRO 1 UNITS: 100 INJECTION, SOLUTION INTRAVENOUS; SUBCUTANEOUS at 11:23

## 2021-11-11 RX ADMIN — SODIUM CHLORIDE 3 G: 900 INJECTION INTRAVENOUS at 18:08

## 2021-11-11 RX ADMIN — SODIUM CHLORIDE 3 G: 900 INJECTION INTRAVENOUS at 05:49

## 2021-11-11 RX ADMIN — SODIUM CHLORIDE 3 G: 900 INJECTION INTRAVENOUS at 13:17

## 2021-11-11 RX ADMIN — ACETAMINOPHEN 650 MG: 325 TABLET, FILM COATED ORAL at 03:13

## 2021-11-11 RX ADMIN — SODIUM CHLORIDE 3 G: 900 INJECTION INTRAVENOUS at 01:00

## 2021-11-11 RX ADMIN — LEVOTHYROXINE SODIUM 200 MCG: 0.1 TABLET ORAL at 05:39

## 2021-11-11 RX ADMIN — BUDESONIDE AND FORMOTEROL FUMARATE DIHYDRATE 2 PUFF: 80; 4.5 AEROSOL RESPIRATORY (INHALATION) at 05:40

## 2021-11-11 RX ADMIN — DOXYCYCLINE 100 MG: 100 TABLET, FILM COATED ORAL at 05:39

## 2021-11-11 RX ADMIN — SODIUM CHLORIDE, POTASSIUM CHLORIDE, SODIUM LACTATE AND CALCIUM CHLORIDE: 600; 310; 30; 20 INJECTION, SOLUTION INTRAVENOUS at 21:23

## 2021-11-11 RX ADMIN — ENOXAPARIN SODIUM 40 MG: 40 INJECTION SUBCUTANEOUS at 17:09

## 2021-11-11 RX ADMIN — ACETAMINOPHEN 1000 MG: 500 TABLET ORAL at 13:15

## 2021-11-11 RX ADMIN — INSULIN LISPRO 1 UNITS: 100 INJECTION, SOLUTION INTRAVENOUS; SUBCUTANEOUS at 16:33

## 2021-11-11 RX ADMIN — INSULIN LISPRO 1 UNITS: 100 INJECTION, SOLUTION INTRAVENOUS; SUBCUTANEOUS at 00:14

## 2021-11-11 RX ADMIN — ACETAMINOPHEN 1000 MG: 500 TABLET ORAL at 21:23

## 2021-11-11 RX ADMIN — SODIUM CHLORIDE, POTASSIUM CHLORIDE, SODIUM LACTATE AND CALCIUM CHLORIDE: 600; 310; 30; 20 INJECTION, SOLUTION INTRAVENOUS at 07:30

## 2021-11-11 RX ADMIN — INSULIN GLARGINE 10 UNITS: 100 INJECTION, SOLUTION SUBCUTANEOUS at 16:34

## 2021-11-11 RX ADMIN — AMLODIPINE BESYLATE 10 MG: 10 TABLET ORAL at 05:39

## 2021-11-11 ASSESSMENT — ENCOUNTER SYMPTOMS
ORTHOPNEA: 0
FEVER: 1
SINUS PAIN: 0
FALLS: 0
BLURRED VISION: 0
CONSTIPATION: 0
DIZZINESS: 0
NAUSEA: 0
CHILLS: 1
TINGLING: 0
SPUTUM PRODUCTION: 0
WEAKNESS: 0
CLAUDICATION: 0
DEPRESSION: 0
HEMOPTYSIS: 0
NECK PAIN: 0
BACK PAIN: 0
SORE THROAT: 0
SHORTNESS OF BREATH: 0
SPEECH CHANGE: 0
WHEEZING: 0
HEARTBURN: 0
EYE PAIN: 0
VOMITING: 0
DIARRHEA: 0
PALPITATIONS: 0
HEADACHES: 0
FLANK PAIN: 0
FOCAL WEAKNESS: 0
MYALGIAS: 0
DIAPHORESIS: 0
ABDOMINAL PAIN: 0
NERVOUS/ANXIOUS: 0
WEIGHT LOSS: 0
SENSORY CHANGE: 0
COUGH: 1

## 2021-11-11 ASSESSMENT — PAIN DESCRIPTION - PAIN TYPE: TYPE: ACUTE PAIN

## 2021-11-11 NOTE — ASSESSMENT & PLAN NOTE
- not in acute exacerbation, acute hypoxic respiratory failure from sepsis.  Improved patient on room air today  - home inhalers, RT protocol for nebs   - encouraged smoking cessation

## 2021-11-11 NOTE — ASSESSMENT & PLAN NOTE
- hold Metformin due to risk of worsening lactic acidosis  - fasting glucose 150 today at goal, down from 200s overnight, will continue  with Lantus 10U at night + SSI. Adjust basal + bolus to goal of <180mg/dl fasting while inpatient  -Sliding scale insulin, Accu-Cheks  -We will continue to monitor

## 2021-11-11 NOTE — ASSESSMENT & PLAN NOTE
Improving  This is Sepsis Present on admission   SIRS criteria identified on my evaluation include: Fever, with temperature greater than 101 deg F, Tachycardia, with heart rate greater than 90 BPM and Leukocytosis, with WBC greater than 12,000   Lactic acidosis of 6.2 on admission, downtrending  Source is undetermined at this time, CTPE does show consolidation on left lower lung consistent with atelectasis, possible early pneumonia.   Source is likely lower extremity cellulitis on the right side, marked with surgical pen will continue to follow,  UA positive although patient denies symptoms. Will start empiric Abx with Unasyn . B  Blood culture grew strep pyogenes, pending susceptibility  We will continue Unasyn for now.  -Tylenol scheduled  -Echocardiogram was obtained without any evidence of infective endocarditis

## 2021-11-11 NOTE — ASSESSMENT & PLAN NOTE
- bilateral indeterminate renal lesions on CTPE, possible hemorrhagic cysts  -Outpatient PCP to follow-up with MRI of the kidneys as outpatient

## 2021-11-11 NOTE — CARE PLAN
The patient is Watcher - Medium risk of patient condition declining or worsening         Progress made toward(s) clinical / shift goals:  Progressing    Problem: Self Care  Goal: Patient will have the ability to perform ADLs independently or with assistance (bathe, groom, dress, toilet and feed)  Outcome: Progressing  Note: Pt being encouraged to complete ADLs independently        Problem: Knowledge Deficit - Standard  Goal: Patient and family/care givers will demonstrate understanding of plan of care, disease process/condition, diagnostic tests and medications  Outcome: Progressing  Note: Pt updated on POC. No questions at this time

## 2021-11-11 NOTE — RESPIRATORY CARE
"COPD EDUCATION by COPD CLINICAL EDUCATOR  11/11/2021  at  10:03 AM by Marivel Menendez RRT     Patient interviewed by COPD education team.  Patient declined to participate in full program.  A short intervention has been conducted.  A comprehensive packet including information about COPD, types of treatments to manage their disease and safe home Oxygen usage was provided and reviewed with patient at the bedside.  Patient was given a spacer with instruction on how to use with his inhalers.     Smoking Cessation Intervention and education completed, 5 minutes spent on smoking cessation education with patient.    Provided smoking cessation packet with \"Tips to Quit\" and brochure for \"Free Smoking Cessation Classes\".     COPD Screen  COPD Risk Screening  Do you have a history of COPD?: Yes  Do you have a Pulmonologist?: Yes    COPD Assessment  COPD Clinical Specialists ONLY  COPD Education Initiated: Yes--Short Intervention ([facility], [facility] for [facility], given spacer w/ instruction, COPD and Smoking Cessation literature.)  DME Company: [facility]  DME Equipment Type: CPAP  [facility]: [facility]  [facility]ulmonologist [facility]: [facility]  Referrals Initiated: Yes  Pulmonary Rehab: N/A  Smoking Cessation: Yes  $ Smoking Cessation 3-10 Minutes: Symptomatic (5 min)  Hospice: N/A  Home Health Care: N/A  [facility] Outreach: N/A  [facility]: N/A  [facility]: N/A  Private In-Home Care Agency: N/A  Is this a COPD exacerbation patient?: No  $ Demo/Eval of SVN's, MDI's and Aerosols: Yes  (OP) Pulmonary Function Testing: Yes (results unavailable)    Meds to Beds  Would the patient like to opt in for Bedside Medication Delivery at Discharge?: No ([facility])     MY COPD ACTION PLAN     It is recommended that patients and physicians /healthcare providers complete this action plan together. This plan should be discussed at each physician visit and updated as needed.    The green, yellow and red zones show groups of symptoms " "of COPD. This list of symptoms is not comprehensive, and you may experience other symptoms. In the \"Actions\" column, your healthcare provider has recommended actions for you to take based on your symptoms.    Patient Name: Vazquez Beckwith   YOB: 1955   Last Updated on:     Green Zone:  I am doing well today Actions   •  Usual activitiy and exercise level •  Take daily medications   •  Usual amounts of cough and phlegm/mucus •  Use oxygen as prescribed   •  Sleep well at night •  Continue regular exercise/diet plan   •  Appetite is good •  At all times avoid cigarette smoke, inhaled irritants     Daily Medications (these medications are taken every day):   Budesonide-Formoterol Fumarate (Symbicort) 2 Puffs Twice daily     Additional Information:  Take using the spacer and rinse your mouth after taking    Yellow Zone:  I am having a bad day or a COPD flare Actions   •  More breathless than usual •  Continue daily medications   •  I have less energy for my daily activities •  Use quick relief inhaler as ordered   •  Increased or thicker phlegm/mucus •  Use oxygen as prescribed   •  Using quick relief inhaler/nebulizer more often •  Get plenty of rest   •  Swelling of ankles more than usual •  Use pursed lip breathing   •  More coughing than usual •  At all times avoid cigarette smoke, inhaled irritants   •  I feel like I have a \"chest cold\"   •  Poor sleep and my symptoms woke me up   •  My appetite is not good   •  My medicine is not helping    •  Call provider immediately if symptoms don’t improve     Continue daily medications, add rescue medications:   Albuterol 2 Puffs Every 6 hours PRN       Medications to be used during a flare up, (as Discussed with Provider):           Additional Information:  Take using the spacer.    Red Zone:  I need urgent medical care Actions   •  Severe shortness of breath even at rest •  Call 911 or seek medical care immediately   •  Not able to do any activity because of " breathing    •  Fever or shaking chills    •  Feeling confused or very drowsy     •  Chest pains    •  Coughing up blood

## 2021-11-11 NOTE — DIETARY
"NUTRITION SERVICES: BMI - Confirmed height with pt and he states his height is 5'11\". Pt with BMI >40 (=Body mass index is 43.97 kg/m².), Class III obesity. Weight loss counseling not appropriate in acute care setting.     RECOMMEND - If appropriate at [place] please refer to outpatient nutrition services for weight management.     "

## 2021-11-11 NOTE — DISCHARGE PLANNING
Care Transition Team Assessment    LSW met with pt at bedside to complete assessment. Pt A&Ox4 and able to verify the information on the face sheet. Pt reported he lives with his wife, twin brother, sister-in-law, adult niece and 8yo great nephew in a two story house. Prior to this hospitalization pt was independent with all ADLs and IADLs. Pt manages his own medications and drives himself. Pt reported he uses a CPAP at home, but no other DME.    Pt stated his support system is mainly his wife and two adult daughters who live nearby. Pt is currently employed full time as a  for the [employer], however is going to apply for SSI. Pt reported he had a history of SA 26 years ago. Pt denies any MH concerns. [facility], unsure of doctors full name at this time. Pt's wife will be able to provide transportation home upon DC.    Information Source  Orientation Level: Oriented X4  Information Given By: Patient  Informant's Name: Vazquez Beckwith  Who is responsible for making decisions for patient? : Patient    Readmission Evaluation  Is this a readmission?: No    Elopement Risk  Legal Hold: No  Ambulatory or Self Mobile in Wheelchair: No-Not an Elopement Risk  Elopement Risk: Not at Risk for Elopement    Interdisciplinary Discharge Planning  Patient or legal guardian wants to designate a caregiver: No  Durable Medical Equipment: Other - Specify (CPAP)    Discharge Preparedness  What is your plan after discharge?: Home with help  What are your discharge supports?: Spouse,Child  Prior Functional Level: Ambulatory,Independent with Activities of Daily Living,Drives Self,Independent with Medication Management  Difficulity with ADLs: None  Difficulity with IADLs: None    Functional Assesment  Prior Functional Level: Ambulatory,Independent with Activities of Daily Living,Drives Self,Independent with Medication Management    Finances  Financial Barriers to Discharge: No  Prescription Coverage: Yes    Vision /  Hearing Impairment  Right Eye Vision: Wears Glasses,Impaired  Left Eye Vision: Impaired,Wears Glasses  Hearing Impairment: Both Ears,Hearing Device Not Available    Domestic Abuse  Have you ever been the victim of abuse or violence?: No  Physical Abuse or Sexual Abuse: No  Verbal Abuse or Emotional Abuse: No  Possible Abuse/Neglect Reported to:: Not Applicable    Psychological Assessment  History of Substance Abuse: None  History of Psychiatric Problems: No  Non-compliant with Treatment: No  Newly Diagnosed Illness: No    Discharge Risks or Barriers  Discharge risks or barriers?: No    Anticipated Discharge Information  Discharge Disposition: Discharged to home/self care (01)

## 2021-11-11 NOTE — PROGRESS NOTES
RN notified of positive blood culture, gram positive cocci and chains. MD notified. Pt already taking unasyn, no new orders at this time.

## 2021-11-11 NOTE — PROGRESS NOTES
"Daily Progress Note:     Date of Service: 11/11/2021  Primary Team: [facility] Team   Attending: Pedro Chatman M.D.   Senior Resident: Dr. Buchanan  Intern: Dr. Leary  Contact:  [phone]    Chief Complaint:   Found down    Subjective:  Interval history: Patient given 1.5 L overnight as he became hypotensive into the 90s systolic, patient also went into A. fib with RVR is monitored on telemetry, in and out of A. fib ever since, without RVR.  Likely secondary to stress response, patient was given oral metoprolol but it already converted back into sinus rhythm overnight with the addition of IV fluids.  Blood cultures growing presumptive strep to coccus species.  Patient on IV Unasyn and doxycycline.  Overall patient is improving from a sepsis standpoint with blood pressures in the 130s this a.m. heart rate in the 80s and no current oxygen requirement.Vazquez is a 66 y.o. male w/ pmhx COPD, tobacco use, DMII and HTN who presented 11/10/2021 with sepsis on abx.     Overall, patient feeling \"worse today.\"  Patient has significant cellulitis on examination likely the source of his bacteremia.  Patient reports recent trauma to his legs from fall, and states that this leg has been hurting him for \"a few days.\"  Patient overall just expresses feeling of fevers, chills and general weakness.  Patient denies any chest pain, any shortness of breath any productive cough or sputum, no nausea/vomiting/diarrhea/constipation or abdominal pain   Patient [employer] , thanks for his service on [employer] today.    Consultants/Specialty:  None   Review of Systems:    Review of Systems   Constitutional: Positive for chills, fever and malaise/fatigue. Negative for diaphoresis and weight loss.   HENT: Negative for congestion, ear discharge, ear pain, hearing loss, sinus pain, sore throat and tinnitus.    Eyes: Negative for blurred vision and pain.   Respiratory: Positive for cough. Negative for hemoptysis, sputum production, shortness " of breath and wheezing.         Chronic nonproductive cough, not worsened from baseline   Cardiovascular: Negative for chest pain, palpitations, orthopnea, claudication and leg swelling.   Gastrointestinal: Negative for abdominal pain, constipation, diarrhea, heartburn, melena, nausea and vomiting.   Genitourinary: Negative for dysuria, flank pain, frequency, hematuria and urgency.   Musculoskeletal: Negative for back pain, falls, joint pain, myalgias and neck pain.   Skin: Negative for itching and rash.        Right lower extremity cellulitis present up to knee, marked with pen.   Neurological: Negative for dizziness, tingling, sensory change, speech change, focal weakness, weakness and headaches.   Psychiatric/Behavioral: Negative for depression. The patient is not nervous/anxious.        Objective Data:   Physical Exam:   Vitals:   Temp:  [36.1 °C (97 °F)-38.2 °C (100.7 °F)] 36.1 °C (97 °F)  Pulse:  [] 98  Resp:  [16-20] 20  BP: (114-165)/(61-96) 137/91  SpO2:  [88 %-95 %] 92 %     Physical Exam  Constitutional:       General: He is not in acute distress.     Appearance: Normal appearance. He is obese. He is not ill-appearing.   HENT:      Head: Normocephalic and atraumatic.      Right Ear: External ear normal.      Left Ear: External ear normal.      Nose: Nose normal. No congestion or rhinorrhea.      Mouth/Throat:      Mouth: Mucous membranes are moist.      Pharynx: Oropharynx is clear. No oropharyngeal exudate or posterior oropharyngeal erythema.   Eyes:      General: No scleral icterus.     Extraocular Movements: Extraocular movements intact.      Conjunctiva/sclera: Conjunctivae normal.      Pupils: Pupils are equal, round, and reactive to light.   Cardiovascular:      Rate and Rhythm: Normal rate and regular rhythm.      Pulses: Normal pulses.      Heart sounds: No murmur heard.  No friction rub. No gallop.    Pulmonary:      Effort: Pulmonary effort is normal. No respiratory distress.      Breath  sounds: Normal breath sounds. No stridor. No wheezing, rhonchi or rales.   Abdominal:      General: Bowel sounds are normal. There is no distension.      Palpations: Abdomen is soft. There is no mass.      Tenderness: There is no abdominal tenderness. There is no guarding.   Musculoskeletal:         General: No swelling or deformity.      Cervical back: No rigidity or tenderness.      Right lower leg: Edema present.      Left lower leg: No edema.      Comments: Right lower extremity cellulitis marked near knee, no open wound.    Lymphadenopathy:      Cervical: No cervical adenopathy.   Skin:     General: Skin is warm and dry.      Capillary Refill: Capillary refill takes less than 2 seconds.      Coloration: Skin is not jaundiced.      Findings: No bruising or lesion.   Neurological:      General: No focal deficit present.      Mental Status: He is alert and oriented to person, place, and time.      Cranial Nerves: No cranial nerve deficit.      Sensory: No sensory deficit.      Motor: No weakness.      Gait: Gait normal.   Psychiatric:         Mood and Affect: Mood normal.         Behavior: Behavior normal.       Labs:   CBC remarkable for white blood cell count 22.7 from twenty-three thousand yesterday hemoglobin 14.3 stable platelet count one ninety-nine  Differential neutrophilic predominance, mildly increased immature granulocytes 0.17  CMP remarkable for sodium 3.9, AST fifty-three elevated, ALT normal twenty-seven, bilirubin normal 0.80 creatinine 1.23, unclear baseline but improved from 1.37 on admission    Imaging:   No new imaging:  Problem Representation:   Vazquez is a 66 y.o. male w/ pmhx COPD, tobacco use, DMII and HTN who presented 11/10/2021 with sepsis.     * Sepsis (HCC)  Assessment & Plan  This is Sepsis Present on admission  SIRS criteria identified on my evaluation include: Fever, with temperature greater than 101 deg F, Tachycardia, with heart rate greater than 90 BPM and Leukocytosis, with WBC  greater than 12,000   Lactic acidosis of 6.2 on admission, downtrending  Source is undetermined at this time, CTPE does show consolidation on left lower lung consistent with atelectasis, possible early pneumonia. UA positive although patient denies symptoms. Will start empiric Abx with Unasyn and Doxy. Blood culture, urine and sputum culture pending. CT head to r/o brain abscess. Hip pain is due to trauma, if patient worsens and no clear source, can consider hip MRI   Sepsis protocol initiated  Fluid resuscitation ordered per protocol  While organ dysfunction may be noted elsewhere in this problem list or in the chart, degree of organ dysfunction does not meet CMS criteria for severe sepsis          Tobacco use disorder  Assessment & Plan  - 1-2 ppd x decades, not motivated to quit   - agreed to NRT while inpatient, will order    Renal cyst  Assessment & Plan  - bilateral indeterminate renal lesions on CTPE, possible hemorrhagic cysts  - primary team to consider MRI for further evaluation vs further evaluation outpatient     Adrenal cyst (HCC)  Assessment & Plan  - incidental finding on left adrenal on CTPE  - will get AM cortisol, metanephrines and catecholamines level to r/o hormonally active tumors     Type 2 diabetes mellitus with hyperglycemia, without long-term current use of insulin ([facility])  Assessment & Plan  - hold Metformin due to risk of worsening lactic acidosis  - fasting glucose 300+ today, will start with Lantus 10U at night + SSI. Adjust basal + bolus to goal of <180mg/dl fasting while inpatient    Acute respiratory failure (HCC)  Assessment & Plan  - no supplemental O2 need at baseline, secondary to sepsis  - continue with supplemental O2 as needed to keep O2 >88%   - See A&P for Sepsis. Encourage incentive spirometry    COPD (chronic obstructive pulmonary disease) ([facility])  Assessment & Plan  - not in acute exacerbation, acute hypoxic respiratory failure from sepsis. Deferring steroids at this time   -  resuming home inhalers, RT protocol for nebs   - encouraged smoking cessation

## 2021-11-11 NOTE — ASSESSMENT & PLAN NOTE
Resolved  no supplemental O2 need at baseline, secondary to sepsis  - continue with supplemental O2 as needed to keep O2 >88%   - See A&P for Sepsis. Encourage incentive spirometry

## 2021-11-11 NOTE — PROGRESS NOTES
Bedside report received. Per night RN pt was going in and out of Sinus tach and A.Fib. Currently NSR in the 90's. Patient A&O x 4. SBP ranging from 120's-160's. lungs diminished throughout, requiring 2 L via NC. RA at baseline. Complains of 3/10 BL hip pain will medicate per MAR. RLE is extremely red & hot with multiple scratches / scabbing. BC came back positive overnight for Gram + cocci. IVF's started this AM at 125 ml/hr POC discussed with patient. Pt verbalizes understanding. Call light and belongings with in reach. Bed locked and in lowest position, alarm and fall precautions in place.

## 2021-11-11 NOTE — ASSESSMENT & PLAN NOTE
- incidental finding on left adrenal on CTPE a.m. cortisol normal, unlikely Cj's disease, will monitor on repeat imaging, will obtain the following labs,, metanephrines and catecholamines level to r/o hormonally active tumors   -Repeat imaging possibly as outpatient once sepsis and bacteremia have resolved CT/MRI of adrenals recommended.

## 2021-11-11 NOTE — H&P
History & Physical Note    Date of Admission: 11/10/2021  Admission Status: Inpatient  [facility] Team: [facility]  Attending: Savage Quezada M.D.   Senior Resident: Dr. Blair  Contact Number: [phone]    Chief Complaint: Weakness    History of Present Illness (HPI):    Vazquez is a 66 y.o. male w/ pmhx COPD, tobacco use, DMII and HTN who presented 11/10/2021 with sepsis. Patient was found down by EMS at a gas station and initially brought in as STEMI code. He was evaluated by cardiology and EDP who determine that ST changes on his EKG is not due to acute MI.   On presentation to the ED, patient had fever of 101F, . Workup reveals WBC of 23.6 with left shift, lactic acid of 6.3 and glucose of 332mg/dl. CTPE without any acute PE, with linear densities of left lung base suggesting atelectasis, indeterminate bilateral renal lesions and indeterminate left adrenal nodule. COVID PCR negative. Patient given Ceftriaxone 2G and  NS 3L   Patient reports that he has been feeling weak and [facility] but passed out at the gas station. He remembers getting out his car, fell weak and had legs gave out on him. His wife at beside and reports that he has had chills, lethargy for the last 3-4 days, she had advise him to seek care earlier but he refused. Patient reports chills, generalized weakness, fatique, denies subjective fever, worsening cough, abdominal pain, n/v or diarrhea. He reports hip pain although says that he fell on it several weeks ago. He denies any known sick contact.   Patient reports [facility], treated and felt generally well until this week.     Review of Systems: All systems reviewed and negative except per HPI     Past Medical History:   Past Medical History was reviewed with patient.   has a past medical history of Chronic obstructive pulmonary disease (HCC), Diabetes (HCC), and Hypertension.    Past Surgical History: Past Surgical History was  reviewed with patient.   has no past surgical history on file.    Medications: Medications have been reviewed with patient.  Prior to Admission Medications   Prescriptions Last Dose Informant Patient Reported? Taking?   albuterol 108 (90 Base) MCG/ACT Aero Soln inhalation aerosol prn at prn  Yes Yes   Sig: Inhale 2 Puffs every 6 hours as needed for Shortness of Breath.   amLODIPine (NORVASC) 10 MG Tab 11/10/2021 at 0330  Yes Yes   Sig: Take 10 mg by mouth every day.   budesonide-formoterol (SYMBICORT) 80-4.5 MCG/ACT Aerosol 11/9/2021 at pm  Yes Yes   Sig: Inhale 2 Puffs 2 times a day.   hydroCHLOROthiazide (HYDRODIURIL) 12.5 MG tablet 11/10/2021 at 0330  Yes Yes   Sig: Take 12.5 mg by mouth every day.   levothyroxine (SYNTHROID) 200 MCG Tab 11/10/2021 at 0330  Yes Yes   Sig: Take 200 mcg by mouth every morning on an empty stomach.   lisinopril (PRINIVIL) 20 MG Tab 11/10/2021 at am  Yes Yes   Sig: Take 20 mg by mouth 2 times a day.   metFORMIN (GLUCOPHAGE) 500 MG Tab 11/9/2021 at pm  Yes Yes   Sig: Take 2,000 mg by mouth every evening.      Facility-Administered Medications: None        Allergies: Allergies have been reviewed with patient.  No Known Allergies    Family History:   family history is not on file.     Social History:   Tobacco: smokes 1 ppd x 50 years  Alcohol: occasional   Recreational drugs (illegal and prescription):  Marijuana   Employment:   Activity Level: independent    Living situation:  Home with wife  Recent travel:  None   [facility]  Other (stressors, spirituality, exposures):  None     Physical Exam:     Vitals:  Temp:  [37.2 °C (99 °F)-38.3 °C (101 °F)] 37.2 °C (99 °F)  Pulse:  [] 115  Resp:  [18-28] 18  BP: ()/(56-66) 124/66  SpO2:  [92 %-99 %] 93 %    Physical Exam    Labs:   Recent Labs     11/10/21  0612   WBC 23.6*   RBC 4.87   HEMOGLOBIN 16.7   HEMATOCRIT 45.8   MCV 94.0   MCH 34.3*   RDW 45.0   PLATELETCT 281   MPV 10.1   NEUTSPOLYS 82.40*    LYMPHOCYTES 11.90*   MONOCYTES 3.90   EOSINOPHILS 0.70   BASOPHILS 0.30     Recent Labs     11/10/21  0612   SODIUM 135   POTASSIUM 4.0   CHLORIDE 98   CO2 19*   GLUCOSE 332*   BUN 20       Imaging:   CT-CTA COMPLETE THORACOABDOMINAL AORTA   Final Result         1.  No aortic aneurysm or dissection identified.   2.  Indeterminate bilateral renal lesions, could represent hemorrhagic cyst, recommend follow-up contrast-enhanced MRI of the kidneys for further characterization.   3.  Indeterminate left adrenal nodule, could be further evaluated with adrenal protocol CT or MRI of the adrenal glands for further characterization.   4.  Linear densities of the left lung base favors changes of atelectasis.   5.  Diverticulosis   6.  Hepatomegaly   7.  Atherosclerosis and atherosclerotic coronary artery disease      DX-CHEST-PORTABLE (1 VIEW)   Final Result         1.  Left basilar atelectasis or early infiltrate.   2.  Trace left pleural effusion      CT-HEAD W/O    (Results Pending)       Previous Data Review: reviewed    Problem Representation:   Vazquez is a 66 y.o. male w/ pmhx COPD, tobacco use, DMII and HTN admitted for management of sepsis with uncertain source    * Sepsis (HCC)  Assessment & Plan  This is Sepsis Present on admission  SIRS criteria identified on my evaluation include: Fever, with temperature greater than 101 deg F, Tachycardia, with heart rate greater than 90 BPM and Leukocytosis, with WBC greater than 12,000   Lactic acidosis of 6.2 on admission, downtrending  Source is undetermined at this time, CTPE does show consolidation on left lower lung consistent with atelectasis, possible early pneumonia. UA positive although patient denies symptoms. Will start empiric Abx with Unasyn and Doxy. Blood culture, urine and sputum culture pending. CT head to r/o brain abscess. Hip pain is due to trauma, if patient worsens and no clear source, can consider hip MRI   Sepsis protocol initiated  Fluid resuscitation  ordered per protocol  While organ dysfunction may be noted elsewhere in this problem list or in the chart, degree of organ dysfunction does not meet CMS criteria for severe sepsis          Tobacco use disorder  Assessment & Plan  - 1-2 ppd x decades, not motivated to quit   - agreed to NRT while inpatient, will order    Renal cyst  Assessment & Plan  - bilateral indeterminate renal lesions on CTPE, possible hemorrhagic cysts  - primary team to consider MRI for further evaluation vs further evaluation outpatient     Adrenal cyst (HCC)  Assessment & Plan  - incidental finding on left adrenal on CTPE  - will get AM cortisol, metanephrines and catecholamines level to r/o hormonally active tumors     Type 2 diabetes mellitus with hyperglycemia, without long-term current use of insulin (HCC)  Assessment & Plan  - hold Metformin due to risk of worsening lactic acidosis  - fasting glucose 300+ today, will start with Lantus 10U at night + SSI. Adjust basal + bolus to goal of <180mg/dl fasting while inpatient    Acute respiratory failure (HCC)  Assessment & Plan  - no supplemental O2 need at baseline, secondary to sepsis  - continue with supplemental O2 as needed to keep O2 >88%   - See A&P for Sepsis. Encourage incentive spirometry    COPD (chronic obstructive pulmonary disease) ([facility])  Assessment & Plan  - not in acute exacerbation, acute hypoxic respiratory failure from sepsis. Deferring steroids at this time   - resuming home inhalers, RT protocol for nebs   - encouraged smoking cessation

## 2021-11-11 NOTE — PROGRESS NOTES
Monitor Summary:   Rhythm: Sinus rhythm, Sinus tach, A fib   Rate:   Measurement: .15/.15/.38  Ectopy: R PVC, BBB

## 2021-11-12 LAB
ANION GAP SERPL CALC-SCNC: 11 MMOL/L (ref 7–16)
BACTERIA UR CULT: ABNORMAL
BACTERIA UR CULT: ABNORMAL
BUN SERPL-MCNC: 13 MG/DL (ref 8–22)
CALCIUM SERPL-MCNC: 8.3 MG/DL (ref 8.5–10.5)
CHLORIDE SERPL-SCNC: 102 MMOL/L (ref 96–112)
CO2 SERPL-SCNC: 23 MMOL/L (ref 20–33)
CREAT SERPL-MCNC: 1.1 MG/DL (ref 0.5–1.4)
ERYTHROCYTE [DISTWIDTH] IN BLOOD BY AUTOMATED COUNT: 44.8 FL (ref 35.9–50)
GLUCOSE BLD-MCNC: 152 MG/DL (ref 65–99)
GLUCOSE BLD-MCNC: 163 MG/DL (ref 65–99)
GLUCOSE BLD-MCNC: 171 MG/DL (ref 65–99)
GLUCOSE BLD-MCNC: 201 MG/DL (ref 65–99)
GLUCOSE SERPL-MCNC: 167 MG/DL (ref 65–99)
HCT VFR BLD AUTO: 40.4 % (ref 42–52)
HGB BLD-MCNC: 14.8 G/DL (ref 14–18)
LV EJECT FRACT MOD 2C 99903: 53.98
LV EJECT FRACT MOD 4C 99902: 59.54
LV EJECT FRACT MOD BP 99901: 57.33
MCH RBC QN AUTO: 34.8 PG (ref 27–33)
MCHC RBC AUTO-ENTMCNC: 36.6 G/DL (ref 33.7–35.3)
MCV RBC AUTO: 95.1 FL (ref 81.4–97.8)
PLATELET # BLD AUTO: 198 K/UL (ref 164–446)
PMV BLD AUTO: 10 FL (ref 9–12.9)
POTASSIUM SERPL-SCNC: 3.7 MMOL/L (ref 3.6–5.5)
RBC # BLD AUTO: 4.25 M/UL (ref 4.7–6.1)
SIGNIFICANT IND 70042: ABNORMAL
SITE SITE: ABNORMAL
SODIUM SERPL-SCNC: 136 MMOL/L (ref 135–145)
SOURCE SOURCE: ABNORMAL
TSH SERPL DL<=0.005 MIU/L-ACNC: 4.69 UIU/ML (ref 0.38–5.33)
WBC # BLD AUTO: 17.3 K/UL (ref 4.8–10.8)

## 2021-11-12 PROCEDURE — A9270 NON-COVERED ITEM OR SERVICE: HCPCS | Performed by: STUDENT IN AN ORGANIZED HEALTH CARE EDUCATION/TRAINING PROGRAM

## 2021-11-12 PROCEDURE — 700105 HCHG RX REV CODE 258: Performed by: STUDENT IN AN ORGANIZED HEALTH CARE EDUCATION/TRAINING PROGRAM

## 2021-11-12 PROCEDURE — 700111 HCHG RX REV CODE 636 W/ 250 OVERRIDE (IP): Performed by: STUDENT IN AN ORGANIZED HEALTH CARE EDUCATION/TRAINING PROGRAM

## 2021-11-12 PROCEDURE — 82962 GLUCOSE BLOOD TEST: CPT | Mod: 91

## 2021-11-12 PROCEDURE — 94660 CPAP INITIATION&MGMT: CPT

## 2021-11-12 PROCEDURE — 700102 HCHG RX REV CODE 250 W/ 637 OVERRIDE(OP): Performed by: STUDENT IN AN ORGANIZED HEALTH CARE EDUCATION/TRAINING PROGRAM

## 2021-11-12 PROCEDURE — 87040 BLOOD CULTURE FOR BACTERIA: CPT | Mod: 91

## 2021-11-12 PROCEDURE — 80048 BASIC METABOLIC PNL TOTAL CA: CPT

## 2021-11-12 PROCEDURE — 85027 COMPLETE CBC AUTOMATED: CPT

## 2021-11-12 PROCEDURE — 770001 HCHG ROOM/CARE - MED/SURG/GYN PRIV*

## 2021-11-12 PROCEDURE — 36415 COLL VENOUS BLD VENIPUNCTURE: CPT

## 2021-11-12 PROCEDURE — 84443 ASSAY THYROID STIM HORMONE: CPT

## 2021-11-12 PROCEDURE — 93306 TTE W/DOPPLER COMPLETE: CPT | Mod: 26 | Performed by: STUDENT IN AN ORGANIZED HEALTH CARE EDUCATION/TRAINING PROGRAM

## 2021-11-12 RX ADMIN — INSULIN GLARGINE 10 UNITS: 100 INJECTION, SOLUTION SUBCUTANEOUS at 18:34

## 2021-11-12 RX ADMIN — LEVOTHYROXINE SODIUM 200 MCG: 0.1 TABLET ORAL at 04:54

## 2021-11-12 RX ADMIN — SODIUM CHLORIDE 3 G: 900 INJECTION INTRAVENOUS at 23:36

## 2021-11-12 RX ADMIN — ACETAMINOPHEN 1000 MG: 500 TABLET ORAL at 04:54

## 2021-11-12 RX ADMIN — SODIUM CHLORIDE 3 G: 900 INJECTION INTRAVENOUS at 13:41

## 2021-11-12 RX ADMIN — INSULIN LISPRO 1 UNITS: 100 INJECTION, SOLUTION INTRAVENOUS; SUBCUTANEOUS at 18:34

## 2021-11-12 RX ADMIN — ACETAMINOPHEN 1000 MG: 500 TABLET ORAL at 13:40

## 2021-11-12 RX ADMIN — AMLODIPINE BESYLATE 10 MG: 10 TABLET ORAL at 04:54

## 2021-11-12 RX ADMIN — ACETAMINOPHEN 1000 MG: 500 TABLET ORAL at 23:36

## 2021-11-12 RX ADMIN — INSULIN LISPRO 1 UNITS: 100 INJECTION, SOLUTION INTRAVENOUS; SUBCUTANEOUS at 06:29

## 2021-11-12 RX ADMIN — INSULIN LISPRO 1 UNITS: 100 INJECTION, SOLUTION INTRAVENOUS; SUBCUTANEOUS at 00:37

## 2021-11-12 RX ADMIN — INSULIN LISPRO 1 UNITS: 100 INJECTION, SOLUTION INTRAVENOUS; SUBCUTANEOUS at 23:36

## 2021-11-12 RX ADMIN — INSULIN LISPRO 2 UNITS: 100 INJECTION, SOLUTION INTRAVENOUS; SUBCUTANEOUS at 13:34

## 2021-11-12 RX ADMIN — SODIUM CHLORIDE 3 G: 900 INJECTION INTRAVENOUS at 18:31

## 2021-11-12 RX ADMIN — ENOXAPARIN SODIUM 40 MG: 40 INJECTION SUBCUTANEOUS at 18:30

## 2021-11-12 RX ADMIN — BUDESONIDE AND FORMOTEROL FUMARATE DIHYDRATE 2 PUFF: 80; 4.5 AEROSOL RESPIRATORY (INHALATION) at 04:57

## 2021-11-12 RX ADMIN — SODIUM CHLORIDE 3 G: 900 INJECTION INTRAVENOUS at 06:25

## 2021-11-12 RX ADMIN — SODIUM CHLORIDE 3 G: 900 INJECTION INTRAVENOUS at 00:33

## 2021-11-12 ASSESSMENT — ENCOUNTER SYMPTOMS
WEAKNESS: 0
CLAUDICATION: 0
NAUSEA: 0
TINGLING: 0
CHILLS: 0
COUGH: 1
FEVER: 0
PALPITATIONS: 0
SPUTUM PRODUCTION: 0
FLANK PAIN: 0
SORE THROAT: 0
FOCAL WEAKNESS: 0
NECK PAIN: 0
EYE PAIN: 0
SPEECH CHANGE: 0
ABDOMINAL PAIN: 0
CONSTIPATION: 0
BLURRED VISION: 0
FALLS: 0
DIZZINESS: 0
NERVOUS/ANXIOUS: 0
HEADACHES: 0
WEIGHT LOSS: 0
WHEEZING: 0
DIAPHORESIS: 0
SHORTNESS OF BREATH: 0
DIARRHEA: 0
SINUS PAIN: 0
HEARTBURN: 0
SENSORY CHANGE: 0
BACK PAIN: 0
DEPRESSION: 0
HEMOPTYSIS: 0
VOMITING: 0
ORTHOPNEA: 0
MYALGIAS: 0

## 2021-11-12 ASSESSMENT — PAIN DESCRIPTION - PAIN TYPE: TYPE: ACUTE PAIN

## 2021-11-12 ASSESSMENT — LIFESTYLE VARIABLES
EVER FELT BAD OR GUILTY ABOUT YOUR DRINKING: NO
AVERAGE NUMBER OF DAYS PER WEEK YOU HAVE A DRINK CONTAINING ALCOHOL: 0
TOTAL SCORE: 0
CONSUMPTION TOTAL: NEGATIVE
TOTAL SCORE: 0
TOTAL SCORE: 0
ON A TYPICAL DAY WHEN YOU DRINK ALCOHOL HOW MANY DRINKS DO YOU HAVE: 0
HAVE PEOPLE ANNOYED YOU BY CRITICIZING YOUR DRINKING: NO
EVER HAD A DRINK FIRST THING IN THE MORNING TO STEADY YOUR NERVES TO GET RID OF A HANGOVER: NO
ALCOHOL_USE: NO
HAVE YOU EVER FELT YOU SHOULD CUT DOWN ON YOUR DRINKING: NO
HOW MANY TIMES IN THE PAST YEAR HAVE YOU HAD 5 OR MORE DRINKS IN A DAY: 0

## 2021-11-12 ASSESSMENT — PATIENT HEALTH QUESTIONNAIRE - PHQ9
SUM OF ALL RESPONSES TO PHQ9 QUESTIONS 1 AND 2: 0
1. LITTLE INTEREST OR PLEASURE IN DOING THINGS: NOT AT ALL
2. FEELING DOWN, DEPRESSED, IRRITABLE, OR HOPELESS: NOT AT ALL

## 2021-11-12 NOTE — PROGRESS NOTES
"Daily Progress Note:     Date of Service: 11/12/2021  Primary Team: [facility]   Attending: Pedro Chatman M.D.   Senior Resident: Dr. Buchanan  Intern: Dr. Leary  Contact:  [phone]    Chief Complaint:   Found down    Subjective:  Interval history: No acute events overnight, no concerns as per night team or nursing, on telemetry patient in sinus rhythm.  No atrial fibrillation on telemetry.  Blood cultures growing presumptive strep to coccus pyogenes.  Patient on IV Unasyn.  Overall patient is improving from a sepsis standpoint with blood pressures in the 130s this a.m. heart rate in the 80s and no current oxygen requirement.Vazquez is a 66 y.o. male w/ pmhx COPD, tobacco use, DMII and HTN who presented 11/10/2021 with sepsis on abx.     Overall, patient feeling \"a heck of a lot better today.\"  Patient has significant cellulitis on examination likely the source of his bacteremia, though cellulitis is now decreased, remarkable leg and we will continue to observe.  Patient reports recent trauma to his legs from fall, and states that this leg has been hurting him for \"a few days.\"  Patient overall just expresses feeling of fevers, chills and general weakness.  Patient denies any chest pain, any shortness of breath any productive cough or sputum, no nausea/vomiting/diarrhea/constipation or abdominal pain     Consultants/Specialty:  None   Review of Systems:    Review of Systems   Constitutional: Negative for chills, diaphoresis, fever, malaise/fatigue and weight loss.   HENT: Negative for congestion, ear discharge, ear pain, hearing loss, sinus pain, sore throat and tinnitus.    Eyes: Negative for blurred vision and pain.   Respiratory: Positive for cough. Negative for hemoptysis, sputum production, shortness of breath and wheezing.         Chronic nonproductive cough, not worsened from baseline   Cardiovascular: Negative for chest pain, palpitations, orthopnea, claudication and leg swelling. "   Gastrointestinal: Negative for abdominal pain, constipation, diarrhea, heartburn, melena, nausea and vomiting.   Genitourinary: Negative for dysuria, flank pain, frequency, hematuria and urgency.   Musculoskeletal: Negative for back pain, falls, joint pain, myalgias and neck pain.   Skin: Negative for itching and rash.        Right lower extremity cellulitis present up to knee, marked with pen.   Neurological: Negative for dizziness, tingling, sensory change, speech change, focal weakness, weakness and headaches.   Psychiatric/Behavioral: Negative for depression. The patient is not nervous/anxious.        Objective Data:   Physical Exam:   Vitals:   Temp:  [36.1 °C (97 °F)-37.6 °C (99.6 °F)] 36.1 °C (97 °F)  Pulse:  [85-97] 97  Resp:  [16-20] 20  BP: (125-140)/(65-83) 131/83  SpO2:  [90 %-98 %] 90 %     Physical Exam  Constitutional:       General: He is not in acute distress.     Appearance: Normal appearance. He is obese. He is not ill-appearing.   HENT:      Head: Normocephalic and atraumatic.      Right Ear: External ear normal.      Left Ear: External ear normal.      Nose: Nose normal. No congestion or rhinorrhea.      Mouth/Throat:      Mouth: Mucous membranes are moist.      Pharynx: Oropharynx is clear. No oropharyngeal exudate or posterior oropharyngeal erythema.   Eyes:      General: No scleral icterus.     Extraocular Movements: Extraocular movements intact.      Conjunctiva/sclera: Conjunctivae normal.      Pupils: Pupils are equal, round, and reactive to light.   Cardiovascular:      Rate and Rhythm: Normal rate and regular rhythm.      Pulses: Normal pulses.      Heart sounds: No murmur heard.  No friction rub. No gallop.    Pulmonary:      Effort: Pulmonary effort is normal. No respiratory distress.      Breath sounds: Normal breath sounds. No stridor. No wheezing, rhonchi or rales.   Abdominal:      General: Bowel sounds are normal. There is no distension.      Palpations: Abdomen is soft. There  is no mass.      Tenderness: There is no abdominal tenderness. There is no guarding.   Musculoskeletal:         General: No swelling or deformity.      Cervical back: No rigidity or tenderness.      Right lower leg: Edema present.      Left lower leg: No edema.      Comments: Right lower extremity cellulitis marked near knee, no open wound.    Lymphadenopathy:      Cervical: No cervical adenopathy.   Skin:     General: Skin is warm and dry.      Capillary Refill: Capillary refill takes less than 2 seconds.      Coloration: Skin is not jaundiced.      Findings: No bruising or lesion.   Neurological:      General: No focal deficit present.      Mental Status: He is alert and oriented to person, place, and time.      Cranial Nerves: No cranial nerve deficit.      Sensory: No sensory deficit.      Motor: No weakness.      Gait: Gait normal.   Psychiatric:         Mood and Affect: Mood normal.         Behavior: Behavior normal.       Labs:   CBC: WBC 17.3 down from 22.7, hemoglobin 14.8 stable, platelets 198 stable neutrophilic differential predominance  CMP remarkable for potassium 3.7 replete, creatinine 1.10 decreasing from 1.23, calcium 8.3 corrects 8.7.  Urine culture: ,000 Klebsiella pansensitive sensitive to Unasyn  Blood cultures: Strep pyogenes group A strep, sensitivities pending  Imaging:   No new imaging    Problem Representation:   Vazquez is a 66 y.o. male w/ pmhx COPD, tobacco use, DMII and HTN who presented 11/10/2021 with sepsis 2/2 bacteremia 2/2 cellulitis from possible bug bite, improving on IV abx.     * Sepsis (HCC)  Assessment & Plan  This is Sepsis Present on admission improving   SIRS criteria identified on my evaluation include: Fever, with temperature greater than 101 deg F, Tachycardia, with heart rate greater than 90 BPM and Leukocytosis, with WBC greater than 12,000   Lactic acidosis of 6.2 on admission, downtrending  Source is undetermined at this time, CTPE does show consolidation on  left lower lung consistent with atelectasis, possible early pneumonia. UA positive although patient denies symptoms. Will start empiric Abx with Unasyn . Blood culture, urine and sputum culture pending.  Source is likely lower extremity cellulitis on the right side, marked with surgical pen will continue to follow,  -Follow blood cultures for sensitivities and final strep species  -IV Unasyn for empiric coverage will likely take 14 days of treatment IV to clear bacteremia  -Tylenol scheduled  -Echocardiogram to help rule out endocarditis, low suspicion at this time.  Sepsis protocol initiated  Fluid resuscitation ordered per protocol  -LR 125ml/hr will stop  While organ dysfunction may be noted elsewhere in this problem list or in the chart, degree of organ dysfunction does not meet CMS criteria for severe sepsis          Tobacco use disorder  Assessment & Plan  - 1-2 ppd x decades, not motivated to quit   - agreed to NRT while inpatient, will order    Renal cyst  Assessment & Plan  - bilateral indeterminate renal lesions on CTPE, possible hemorrhagic cysts  -Outpatient PCP to follow-up with MRI of the kidneys as outpatient     Adrenal cyst (HCC)  Assessment & Plan  - incidental finding on left adrenal on CTPE a.m. cortisol normal, unlikely [place]'s disease, will monitor on repeat imaging, will obtain the following labs,, metanephrines and catecholamines level to r/o hormonally active tumors   -Repeat imaging possibly as outpatient once sepsis and bacteremia have resolved CT/MRI of adrenals recommended.      Type 2 diabetes mellitus with hyperglycemia, without long-term current use of insulin (HCC)  Assessment & Plan  - hold Metformin due to risk of worsening lactic acidosis  - fasting glucose 150 today at goal, down from 200s overnight, will continue  with Lantus 10U at night + SSI. Adjust basal + bolus to goal of <180mg/dl fasting while inpatient  -Sliding scale insulin, Accu-Cheks  -We will continue to  monitor    Acute respiratory failure (HCC)  Assessment & Plan  Resolved  no supplemental O2 need at baseline, secondary to sepsis  - continue with supplemental O2 as needed to keep O2 >88%   - See A&P for Sepsis. Encourage incentive spirometry    COPD (chronic obstructive pulmonary disease) ([facility])  Assessment & Plan  - not in acute exacerbation, acute hypoxic respiratory failure from sepsis.  Improved patient on room air today  - home inhalers, RT protocol for nebs   - encouraged smoking cessation    DVT: Enoxaparin  CODE STATUS: Full code

## 2021-11-12 NOTE — CARE PLAN
The patient is Watcher - Medium risk of patient condition declining or worsening           Problem: Care Map:  Optimal Outcome for the Pneumonia Patient  Goal: Collection and monitoring of appropriate tests and labs  Outcome: Progressing     Problem: Respiratory  Goal: Patient will achieve/maintain optimum respiratory ventilation and gas exchange  Outcome: Progressing     Problem: Risk for Aspiration  Goal: Patient's risk for aspiration will be absent or decrease  Outcome: Progressing

## 2021-11-12 NOTE — PROGRESS NOTES
Report receive from YENI Howell. Patient is alert and oriented to 4 this AM. Fall precautions are in place and tele-box is monitoring. Patient denies any pain at this time.

## 2021-11-13 PROBLEM — L03.90 CELLULITIS: Status: ACTIVE | Noted: 2021-11-13

## 2021-11-13 LAB
ALBUMIN SERPL BCP-MCNC: 3.3 G/DL (ref 3.2–4.9)
ALBUMIN/GLOB SERPL: 1.1 G/DL
ALP SERPL-CCNC: 73 U/L (ref 30–99)
ALT SERPL-CCNC: 28 U/L (ref 2–50)
ANION GAP SERPL CALC-SCNC: 10 MMOL/L (ref 7–16)
AST SERPL-CCNC: 53 U/L (ref 12–45)
BACTERIA BLD CULT: ABNORMAL
BACTERIA BLD CULT: ABNORMAL
BASOPHILS # BLD AUTO: 0.6 % (ref 0–1.8)
BASOPHILS # BLD: 0.06 K/UL (ref 0–0.12)
BILIRUB SERPL-MCNC: 0.5 MG/DL (ref 0.1–1.5)
BUN SERPL-MCNC: 13 MG/DL (ref 8–22)
CALCIUM SERPL-MCNC: 8.3 MG/DL (ref 8.5–10.5)
CHLORIDE SERPL-SCNC: 101 MMOL/L (ref 96–112)
CO2 SERPL-SCNC: 22 MMOL/L (ref 20–33)
CREAT SERPL-MCNC: 1.07 MG/DL (ref 0.5–1.4)
EOSINOPHIL # BLD AUTO: 0.39 K/UL (ref 0–0.51)
EOSINOPHIL NFR BLD: 3.6 % (ref 0–6.9)
ERYTHROCYTE [DISTWIDTH] IN BLOOD BY AUTOMATED COUNT: 43.6 FL (ref 35.9–50)
ERYTHROCYTE [DISTWIDTH] IN BLOOD BY AUTOMATED COUNT: 43.7 FL (ref 35.9–50)
GLOBULIN SER CALC-MCNC: 3 G/DL (ref 1.9–3.5)
GLUCOSE BLD-MCNC: 127 MG/DL (ref 65–99)
GLUCOSE BLD-MCNC: 150 MG/DL (ref 65–99)
GLUCOSE BLD-MCNC: 153 MG/DL (ref 65–99)
GLUCOSE BLD-MCNC: 159 MG/DL (ref 65–99)
GLUCOSE BLD-MCNC: 162 MG/DL (ref 65–99)
GLUCOSE SERPL-MCNC: 159 MG/DL (ref 65–99)
HCT VFR BLD AUTO: 39.2 % (ref 42–52)
HCT VFR BLD AUTO: 42.4 % (ref 42–52)
HGB BLD-MCNC: 14.5 G/DL (ref 14–18)
HGB BLD-MCNC: 15.3 G/DL (ref 14–18)
IMM GRANULOCYTES # BLD AUTO: 0.1 K/UL (ref 0–0.11)
IMM GRANULOCYTES NFR BLD AUTO: 0.9 % (ref 0–0.9)
LYMPHOCYTES # BLD AUTO: 2.06 K/UL (ref 1–4.8)
LYMPHOCYTES NFR BLD: 18.9 % (ref 22–41)
MCH RBC QN AUTO: 33.5 PG (ref 27–33)
MCH RBC QN AUTO: 34.5 PG (ref 27–33)
MCHC RBC AUTO-ENTMCNC: 36.1 G/DL (ref 33.7–35.3)
MCHC RBC AUTO-ENTMCNC: 37 G/DL (ref 33.7–35.3)
MCV RBC AUTO: 92.8 FL (ref 81.4–97.8)
MCV RBC AUTO: 93.3 FL (ref 81.4–97.8)
MONOCYTES # BLD AUTO: 0.63 K/UL (ref 0–0.85)
MONOCYTES NFR BLD AUTO: 5.8 % (ref 0–13.4)
NEUTROPHILS # BLD AUTO: 7.65 K/UL (ref 1.82–7.42)
NEUTROPHILS NFR BLD: 70.2 % (ref 44–72)
NRBC # BLD AUTO: 0 K/UL
NRBC BLD-RTO: 0 /100 WBC
PLATELET # BLD AUTO: 200 K/UL (ref 164–446)
PLATELET # BLD AUTO: 233 K/UL (ref 164–446)
PMV BLD AUTO: 9.8 FL (ref 9–12.9)
PMV BLD AUTO: 9.9 FL (ref 9–12.9)
POTASSIUM SERPL-SCNC: 3.6 MMOL/L (ref 3.6–5.5)
PROT SERPL-MCNC: 6.3 G/DL (ref 6–8.2)
RBC # BLD AUTO: 4.2 M/UL (ref 4.7–6.1)
RBC # BLD AUTO: 4.57 M/UL (ref 4.7–6.1)
SIGNIFICANT IND 70042: ABNORMAL
SITE SITE: ABNORMAL
SODIUM SERPL-SCNC: 133 MMOL/L (ref 135–145)
SOURCE SOURCE: ABNORMAL
WBC # BLD AUTO: 10.9 K/UL (ref 4.8–10.8)
WBC # BLD AUTO: 11.5 K/UL (ref 4.8–10.8)

## 2021-11-13 PROCEDURE — A9270 NON-COVERED ITEM OR SERVICE: HCPCS | Performed by: STUDENT IN AN ORGANIZED HEALTH CARE EDUCATION/TRAINING PROGRAM

## 2021-11-13 PROCEDURE — 80053 COMPREHEN METABOLIC PANEL: CPT

## 2021-11-13 PROCEDURE — 82962 GLUCOSE BLOOD TEST: CPT

## 2021-11-13 PROCEDURE — 700102 HCHG RX REV CODE 250 W/ 637 OVERRIDE(OP): Performed by: STUDENT IN AN ORGANIZED HEALTH CARE EDUCATION/TRAINING PROGRAM

## 2021-11-13 PROCEDURE — 700105 HCHG RX REV CODE 258: Performed by: STUDENT IN AN ORGANIZED HEALTH CARE EDUCATION/TRAINING PROGRAM

## 2021-11-13 PROCEDURE — 700111 HCHG RX REV CODE 636 W/ 250 OVERRIDE (IP): Performed by: HOSPITALIST

## 2021-11-13 PROCEDURE — 85027 COMPLETE CBC AUTOMATED: CPT

## 2021-11-13 PROCEDURE — 36415 COLL VENOUS BLD VENIPUNCTURE: CPT

## 2021-11-13 PROCEDURE — 700111 HCHG RX REV CODE 636 W/ 250 OVERRIDE (IP): Performed by: STUDENT IN AN ORGANIZED HEALTH CARE EDUCATION/TRAINING PROGRAM

## 2021-11-13 PROCEDURE — 770001 HCHG ROOM/CARE - MED/SURG/GYN PRIV*

## 2021-11-13 PROCEDURE — 85025 COMPLETE CBC W/AUTO DIFF WBC: CPT

## 2021-11-13 PROCEDURE — 700105 HCHG RX REV CODE 258: Performed by: HOSPITALIST

## 2021-11-13 RX ORDER — DEXTROSE MONOHYDRATE 25 G/50ML
50 INJECTION, SOLUTION INTRAVENOUS
Status: DISCONTINUED | OUTPATIENT
Start: 2021-11-13 | End: 2021-11-14 | Stop reason: HOSPADM

## 2021-11-13 RX ORDER — INSULIN LISPRO 100 [IU]/ML
1-6 INJECTION, SOLUTION INTRAVENOUS; SUBCUTANEOUS
Status: DISCONTINUED | OUTPATIENT
Start: 2021-11-13 | End: 2021-11-14 | Stop reason: HOSPADM

## 2021-11-13 RX ADMIN — SODIUM CHLORIDE 3 G: 900 INJECTION INTRAVENOUS at 11:37

## 2021-11-13 RX ADMIN — BUDESONIDE AND FORMOTEROL FUMARATE DIHYDRATE 2 PUFF: 80; 4.5 AEROSOL RESPIRATORY (INHALATION) at 16:34

## 2021-11-13 RX ADMIN — AMLODIPINE BESYLATE 10 MG: 10 TABLET ORAL at 06:01

## 2021-11-13 RX ADMIN — SODIUM CHLORIDE 3 G: 900 INJECTION INTRAVENOUS at 16:48

## 2021-11-13 RX ADMIN — INSULIN LISPRO 1 UNITS: 100 INJECTION, SOLUTION INTRAVENOUS; SUBCUTANEOUS at 06:07

## 2021-11-13 RX ADMIN — ACETAMINOPHEN 1000 MG: 500 TABLET ORAL at 20:48

## 2021-11-13 RX ADMIN — SODIUM CHLORIDE 3 G: 900 INJECTION INTRAVENOUS at 06:02

## 2021-11-13 RX ADMIN — ENOXAPARIN SODIUM 40 MG: 40 INJECTION SUBCUTANEOUS at 16:39

## 2021-11-13 RX ADMIN — ACETAMINOPHEN 1000 MG: 500 TABLET ORAL at 06:01

## 2021-11-13 RX ADMIN — BUDESONIDE AND FORMOTEROL FUMARATE DIHYDRATE 2 PUFF: 80; 4.5 AEROSOL RESPIRATORY (INHALATION) at 06:02

## 2021-11-13 RX ADMIN — INSULIN LISPRO 1 UNITS: 100 INJECTION, SOLUTION INTRAVENOUS; SUBCUTANEOUS at 16:33

## 2021-11-13 RX ADMIN — LEVOTHYROXINE SODIUM 200 MCG: 0.1 TABLET ORAL at 06:01

## 2021-11-13 ASSESSMENT — ENCOUNTER SYMPTOMS
DIAPHORESIS: 0
SPEECH CHANGE: 0
MYALGIAS: 0
TINGLING: 0
DEPRESSION: 0
FALLS: 0
FEVER: 0
CLAUDICATION: 0
COUGH: 1
SHORTNESS OF BREATH: 0
HEMOPTYSIS: 0
WEAKNESS: 0
VOMITING: 0
SORE THROAT: 0
SENSORY CHANGE: 0
NERVOUS/ANXIOUS: 0
DIZZINESS: 0
FOCAL WEAKNESS: 0
SPUTUM PRODUCTION: 0
NECK PAIN: 0
WHEEZING: 0
EYE PAIN: 0
SINUS PAIN: 0
ABDOMINAL PAIN: 0
HEADACHES: 0
CONSTIPATION: 0
PALPITATIONS: 0
CHILLS: 0
NAUSEA: 0
BLURRED VISION: 0
BACK PAIN: 0
WEIGHT LOSS: 0
ORTHOPNEA: 0
DIARRHEA: 0
HEARTBURN: 0
FLANK PAIN: 0

## 2021-11-13 ASSESSMENT — FIBROSIS 4 INDEX: FIB4 SCORE: 2.84

## 2021-11-13 NOTE — ASSESSMENT & PLAN NOTE
With right lower extremity swelling and erythema associate with fever and generalized malaise  Swelling significantly better with continuation of antibiotic (Unasyn)  We will continue antibiotics awaiting final culture and sensitivity of the possible culture (strep pyogenes).  We may switch him to p.o. antibiotics on discharge.

## 2021-11-13 NOTE — PROGRESS NOTES
Daily Progress Note:     Date of Service: 11/13/2021  Primary Team: [facility]   Attending: Pedro Chatman M.D.   Senior Resident: Dr. Buchanan  Intern: Dr. Leary  Contact:  [phone]    Chief Complaint:   Found down      ID:  66 y.o. male w/ pmhx COPD, tobacco use, DMII and HTN who presented 11/10/2021 with sepsis likely secondary to cellulitis.     Subjective/interval changes:  No acute overnight issues  Denied fever, chills, chest pain, vomiting, diarrhea nausea, or shortness of breath  Blood culture grew Streptococcus pyogenes, pending susceptibility still on Unasyn  The right lower extremity erythema and swelling are significantly better with continuation of Abx      Consultants/Specialty:  None     Review of Systems:    Review of Systems   Constitutional: Negative for chills, diaphoresis, fever, malaise/fatigue and weight loss.   HENT: Negative for congestion, ear discharge, ear pain, hearing loss, sinus pain, sore throat and tinnitus.    Eyes: Negative for blurred vision and pain.   Respiratory: Positive for cough. Negative for hemoptysis, sputum production, shortness of breath and wheezing.         Chronic nonproductive cough, not worsened from baseline   Cardiovascular: Negative for chest pain, palpitations, orthopnea, claudication and leg swelling.   Gastrointestinal: Negative for abdominal pain, constipation, diarrhea, heartburn, melena, nausea and vomiting.   Genitourinary: Negative for dysuria, flank pain, frequency, hematuria and urgency.   Musculoskeletal: Negative for back pain, falls, joint pain, myalgias and neck pain.   Skin: Negative for itching and rash.        Right lower extremity cellulitis present up to knee, marked with pen.   Neurological: Negative for dizziness, tingling, sensory change, speech change, focal weakness, weakness and headaches.   Psychiatric/Behavioral: Negative for depression. The patient is not nervous/anxious.        Objective Data:   Physical Exam:   Vitals:    Temp:  [36.1 °C (97 °F)-36.6 °C (97.8 °F)] 36.6 °C (97.8 °F)  Pulse:  [74-81] 74  Resp:  [16-18] 16  BP: (112-139)/(70-88) 120/75  SpO2:  [90 %-92 %] 90 %     Physical Exam  Constitutional:       General: He is not in acute distress.     Appearance: Normal appearance. He is obese. He is not ill-appearing.   HENT:      Head: Normocephalic and atraumatic.      Right Ear: External ear normal.      Left Ear: External ear normal.      Nose: Nose normal. No congestion or rhinorrhea.      Mouth/Throat:      Mouth: Mucous membranes are moist.      Pharynx: Oropharynx is clear. No oropharyngeal exudate or posterior oropharyngeal erythema.   Eyes:      General: No scleral icterus.     Extraocular Movements: Extraocular movements intact.      Conjunctiva/sclera: Conjunctivae normal.      Pupils: Pupils are equal, round, and reactive to light.   Cardiovascular:      Rate and Rhythm: Normal rate and regular rhythm.      Pulses: Normal pulses.      Heart sounds: No murmur heard.  No friction rub. No gallop.    Pulmonary:      Effort: Pulmonary effort is normal. No respiratory distress.      Breath sounds: Normal breath sounds. No stridor. No wheezing, rhonchi or rales.   Abdominal:      General: Bowel sounds are normal. There is no distension.      Palpations: Abdomen is soft. There is no mass.      Tenderness: There is no abdominal tenderness. There is no guarding.   Musculoskeletal:         General: No swelling or deformity.      Cervical back: No rigidity or tenderness.      Right lower leg: Edema present.      Left lower leg: No edema.      Comments: Right lower extremity cellulitis marked near knee, no open wound.    Lymphadenopathy:      Cervical: No cervical adenopathy.   Skin:     General: Skin is warm and dry.      Capillary Refill: Capillary refill takes less than 2 seconds.      Coloration: Skin is not jaundiced.      Findings: No bruising or lesion.   Neurological:      General: No focal deficit present.      Mental  Status: He is alert and oriented to person, place, and time.      Cranial Nerves: No cranial nerve deficit.      Sensory: No sensory deficit.      Motor: No weakness.      Gait: Gait normal.   Psychiatric:         Mood and Affect: Mood normal.         Behavior: Behavior normal.       Labs:   Recent Labs     11/11/21 0033 11/12/21 0110 11/13/21 0038   WBC 22.7* 17.3* 11.5*   RBC 4.28* 4.25* 4.20*   HEMOGLOBIN 14.3 14.8 14.5   HEMATOCRIT 41.1* 40.4* 39.2*   MCV 96.0 95.1 93.3   MCH 33.4* 34.8* 34.5*   RDW 47.1 44.8 43.7   PLATELETCT 199 198 200   MPV 10.5 10.0 9.9   NEUTSPOLYS 89.20*  --   --    LYMPHOCYTES 5.90*  --   --    MONOCYTES 3.70  --   --    EOSINOPHILS 0.00  --   --    BASOPHILS 0.50  --   --      Recent Labs     11/11/21 0033 11/12/21 0110 11/13/21 0038   SODIUM 135 136 133*   POTASSIUM 3.9 3.7 3.6   CHLORIDE 101 102 101   CO2 24 23 22   GLUCOSE 205* 167* 159*   BUN 14 13 13     Recent Labs     11/11/21 0033 11/12/21 0110 11/13/21 0038   ALBUMIN 3.4  --  3.3   TBILIRUBIN 0.8  --  0.5   ALKPHOSPHAT 66  --  73   TOTPROTEIN 6.2  --  6.3   ALTSGPT 27  --  28   ASTSGOT 53*  --  53*   CREATININE 1.23 1.10 1.07       Imaging:   EC-ECHOCARDIOGRAM COMPLETE W/O CONT   Final Result      CT-HEAD W/O   Final Result      1.  Chronic LEFT basal ganglia lacunar infarct.   2.  No acute intracranial abnormality.   3.  Acute and chronic paranasal sinus disease.   4.  RIGHT mastoidectomy.      CT-CTA COMPLETE THORACOABDOMINAL AORTA   Final Result         1.  No aortic aneurysm or dissection identified.   2.  Indeterminate bilateral renal lesions, could represent hemorrhagic cyst, recommend follow-up contrast-enhanced MRI of the kidneys for further characterization.   3.  Indeterminate left adrenal nodule, could be further evaluated with adrenal protocol CT or MRI of the adrenal glands for further characterization.   4.  Linear densities of the left lung base favors changes of atelectasis.   5.  Diverticulosis   6.   Hepatomegaly   7.  Atherosclerosis and atherosclerotic coronary artery disease      DX-CHEST-PORTABLE (1 VIEW)   Final Result         1.  Left basilar atelectasis or early infiltrate.   2.  Trace left pleural effusion            Problem Representation:   Vazquez is a 66 y.o. male w/ pmhx COPD, tobacco use, DMII and HTN who presented 11/10/2021 with sepsis 2/2 bacteremia 2/2 cellulitis from possible bug bite, improving on IV abx.     * Bacteremia  Assessment & Plan  Improving  This is Sepsis Present on admission   SIRS criteria identified on my evaluation include: Fever, with temperature greater than 101 deg F, Tachycardia, with heart rate greater than 90 BPM and Leukocytosis, with WBC greater than 12,000   Lactic acidosis of 6.2 on admission, downtrending  Source is undetermined at this time, CTPE does show consolidation on left lower lung consistent with atelectasis, possible early pneumonia.   Source is likely lower extremity cellulitis on the right side, marked with surgical pen will continue to follow,  UA positive although patient denies symptoms. Will start empiric Abx with Unasyn . B  Blood culture grew strep pyogenes, pending susceptibility  We will continue Unasyn for now.  -Tylenol scheduled  -Echocardiogram was obtained without any evidence of infective endocarditis      Cellulitis  Assessment & Plan  With right lower extremity swelling and erythema associate with fever and generalized malaise  Swelling significantly better with continuation of antibiotic (Unasyn)  We will continue antibiotics awaiting final culture and sensitivity of the possible culture (strep pyogenes).  We may switch him to p.o. antibiotics on discharge.    Tobacco use disorder  Assessment & Plan  - 1-2 ppd x decades, not motivated to quit   - agreed to NRT while inpatient, will order    Renal cyst  Assessment & Plan  - bilateral indeterminate renal lesions on CTPE, possible hemorrhagic cysts  -Outpatient PCP to follow-up with MRI of the  kidneys as outpatient     Adrenal cyst (HCC)  Assessment & Plan  - incidental finding on left adrenal on CTPE a.m. cortisol normal, unlikely [place]'s disease, will monitor on repeat imaging, will obtain the following labs,, metanephrines and catecholamines level to r/o hormonally active tumors   -Repeat imaging possibly as outpatient once sepsis and bacteremia have resolved CT/MRI of adrenals recommended.      Type 2 diabetes mellitus with hyperglycemia, without long-term current use of insulin ([facility])  Assessment & Plan  - hold Metformin due to risk of worsening lactic acidosis  - fasting glucose 150 today at goal, down from 200s overnight, will continue  with Lantus 10U at night + SSI. Adjust basal + bolus to goal of <180mg/dl fasting while inpatient  -Sliding scale insulin, Accu-Cheks  -We will continue to monitor    Acute respiratory failure ([facility])  Assessment & Plan  Resolved  no supplemental O2 need at baseline, secondary to sepsis  - continue with supplemental O2 as needed to keep O2 >88%   - See A&P for Sepsis. Encourage incentive spirometry    COPD (chronic obstructive pulmonary disease) ([facility])  Assessment & Plan  - not in acute exacerbation, acute hypoxic respiratory failure from sepsis.  Improved patient on room air today  - home inhalers, RT protocol for nebs   - encouraged smoking cessation    DVT: Enoxaparin  CODE STATUS: Full code

## 2021-11-13 NOTE — CARE PLAN
Problem: Knowledge Deficit - Standard  Goal: Patient and family/care givers will demonstrate understanding of plan of care, disease process/condition, diagnostic tests and medications  Outcome: Progressing  Note: Discussed POC and medications with patient.  Patient verbalized understanding.

## 2021-11-13 NOTE — CARE PLAN
The patient is Stable - Low risk of patient condition declining or worsening         Progress made toward(s) clinical / shift goals:  pt showered today, vitals stable, pain well managed, RLE redness/swelling improving.     Patient is not progressing towards the following goals: Repeat blood cultures pending, C&S pending for oral antibiotics.

## 2021-11-13 NOTE — PROGRESS NOTES
Assumed care at 0700. Bedside report received from Dante. Patient's chart and MAR reviewed. Pt denies pain at this time. Pt is A & O 4. Patient was updated on plan of care for the day. Questions answered and concerns addressed.  Pt denies any additional needs at this time. White board updated. Call light, phone and personal belongings within reach.

## 2021-11-14 VITALS
BODY MASS INDEX: 42.16 KG/M2 | RESPIRATION RATE: 16 BRPM | HEART RATE: 60 BPM | HEIGHT: 71 IN | TEMPERATURE: 97.4 F | WEIGHT: 301.15 LBS | SYSTOLIC BLOOD PRESSURE: 122 MMHG | DIASTOLIC BLOOD PRESSURE: 67 MMHG | OXYGEN SATURATION: 92 %

## 2021-11-14 LAB
ALBUMIN SERPL BCP-MCNC: 3.5 G/DL (ref 3.2–4.9)
ALBUMIN/GLOB SERPL: 1.2 G/DL
ALP SERPL-CCNC: 71 U/L (ref 30–99)
ALT SERPL-CCNC: 32 U/L (ref 2–50)
ANION GAP SERPL CALC-SCNC: 10 MMOL/L (ref 7–16)
AST SERPL-CCNC: 55 U/L (ref 12–45)
BILIRUB SERPL-MCNC: 0.5 MG/DL (ref 0.1–1.5)
BUN SERPL-MCNC: 12 MG/DL (ref 8–22)
CALCIUM SERPL-MCNC: 8.7 MG/DL (ref 8.5–10.5)
CATECHOLS UR-IMP: ABNORMAL
CHLORIDE SERPL-SCNC: 100 MMOL/L (ref 96–112)
CO2 SERPL-SCNC: 26 MMOL/L (ref 20–33)
COLLECT DURATION TIME SPEC: ABNORMAL HRS
COLLECT DURATION TIME SPEC: NORMAL HRS
CREAT 24H UR-MCNC: 100 MG/DL
CREAT 24H UR-MCNC: 95 MG/DL
CREAT 24H UR-MRATE: ABNORMAL MG/D (ref 800–2100)
CREAT 24H UR-MRATE: NORMAL MG/D (ref 800–2100)
CREAT SERPL-MCNC: 1.24 MG/DL (ref 0.5–1.4)
DOPAMINE 24H UR-MRATE: ABNORMAL UG/D (ref 71–485)
DOPAMINE SERPL-MCNC: 50 PG/ML (ref 0–20)
DOPAMINE UR-MCNC: 180 UG/L
DOPAMINE/CREAT UR: 189 UG/G CRT (ref 0–250)
EPINEPH 24H UR-MRATE: ABNORMAL (ref 1–14)
EPINEPH PLAS-MCNC: 34 PG/ML (ref 10–200)
EPINEPH UR-MCNC: 30 UG/L
EPINEPH/CREAT UR: 32 UG/G CRT (ref 0–20)
GLOBULIN SER CALC-MCNC: 2.9 G/DL (ref 1.9–3.5)
GLUCOSE BLD-MCNC: 155 MG/DL (ref 65–99)
GLUCOSE BLD-MCNC: 157 MG/DL (ref 65–99)
GLUCOSE SERPL-MCNC: 146 MG/DL (ref 65–99)
METANEPH 24H UR-MCNC: 103 UG/L
METANEPH 24H UR-MRATE: NORMAL UG/D (ref 55–320)
METANEPH/CREAT 24H UR: 103 UG/G CRT (ref 0–300)
METANEPHS SERPL-SCNC: 0.12 NMOL/L (ref 0–0.49)
NOREPINEPH 24H UR-MRATE: ABNORMAL UG/D (ref 14–120)
NOREPINEPH PLAS-MCNC: 1198 PG/ML (ref 80–520)
NOREPINEPH UR-MCNC: 113 UG/L
NOREPINEPH/CREAT UR: 119 UG/G CRT (ref 0–45)
NORMETANEPHRINE 24H UR-MCNC: 295 UG/L
NORMETANEPHRINE 24H UR-MRATE: NORMAL UG/D (ref 114–865)
NORMETANEPHRINE SERPL-SCNC: 1.11 NMOL/L (ref 0–0.89)
NORMETANEPHRINE/CREAT 24H UR: 295 UG/G CRT (ref 0–400)
POTASSIUM SERPL-SCNC: 4 MMOL/L (ref 3.6–5.5)
PROT SERPL-MCNC: 6.4 G/DL (ref 6–8.2)
SODIUM SERPL-SCNC: 136 MMOL/L (ref 135–145)
SPECIMEN VOL ?TM UR: ABNORMAL ML
SPECIMEN VOL ?TM UR: NORMAL ML

## 2021-11-14 PROCEDURE — 80053 COMPREHEN METABOLIC PANEL: CPT

## 2021-11-14 PROCEDURE — 700105 HCHG RX REV CODE 258: Performed by: HOSPITALIST

## 2021-11-14 PROCEDURE — 82962 GLUCOSE BLOOD TEST: CPT | Mod: 91

## 2021-11-14 PROCEDURE — 700102 HCHG RX REV CODE 250 W/ 637 OVERRIDE(OP): Performed by: STUDENT IN AN ORGANIZED HEALTH CARE EDUCATION/TRAINING PROGRAM

## 2021-11-14 PROCEDURE — 36415 COLL VENOUS BLD VENIPUNCTURE: CPT

## 2021-11-14 PROCEDURE — 700111 HCHG RX REV CODE 636 W/ 250 OVERRIDE (IP): Performed by: HOSPITALIST

## 2021-11-14 PROCEDURE — A9270 NON-COVERED ITEM OR SERVICE: HCPCS | Performed by: STUDENT IN AN ORGANIZED HEALTH CARE EDUCATION/TRAINING PROGRAM

## 2021-11-14 RX ORDER — CLINDAMYCIN HYDROCHLORIDE 150 MG/1
150 CAPSULE ORAL 3 TIMES DAILY
Refills: 0 | Status: CANCELLED | OUTPATIENT
Start: 2021-11-14

## 2021-11-14 RX ORDER — AMOXICILLIN 875 MG/1
875 TABLET, COATED ORAL 2 TIMES DAILY
Qty: 21 TABLET | Refills: 0 | Status: SHIPPED | OUTPATIENT
Start: 2021-11-14

## 2021-11-14 RX ADMIN — BUDESONIDE AND FORMOTEROL FUMARATE DIHYDRATE 2 PUFF: 80; 4.5 AEROSOL RESPIRATORY (INHALATION) at 05:15

## 2021-11-14 RX ADMIN — AMLODIPINE BESYLATE 10 MG: 10 TABLET ORAL at 05:14

## 2021-11-14 RX ADMIN — INSULIN LISPRO 1 UNITS: 100 INJECTION, SOLUTION INTRAVENOUS; SUBCUTANEOUS at 05:18

## 2021-11-14 RX ADMIN — ACETAMINOPHEN 1000 MG: 500 TABLET ORAL at 05:14

## 2021-11-14 RX ADMIN — SODIUM CHLORIDE 3 G: 900 INJECTION INTRAVENOUS at 02:41

## 2021-11-14 RX ADMIN — INSULIN LISPRO 1 UNITS: 100 INJECTION, SOLUTION INTRAVENOUS; SUBCUTANEOUS at 10:30

## 2021-11-14 RX ADMIN — SODIUM CHLORIDE 3 G: 900 INJECTION INTRAVENOUS at 07:56

## 2021-11-14 RX ADMIN — LEVOTHYROXINE SODIUM 200 MCG: 0.1 TABLET ORAL at 05:14

## 2021-11-14 ASSESSMENT — PAIN DESCRIPTION - PAIN TYPE: TYPE: CHRONIC PAIN

## 2021-11-14 NOTE — PROGRESS NOTES
4 Eyes Skin Assessment Completed by YENI Hsu and YENI Veloz.    Head WDL  Ears WDL  Nose WDL  Mouth WDL  Neck WDL  Breast/Chest WDL  Shoulder Blades WDL  Spine WDL  (R) Arm/Elbow/Hand Bruising  (L) Arm/Elbow/Hand WDL  Abdomen WDL  Groin WDL  Scrotum/Coccyx/Buttocks WDL  (R) Leg Redness and Swelling  (L) Leg Redness and Scab to knee  (R) Heel/Foot/Toe Redness and Blanching  (L) Heel/Foot/Toe Redness, Blanching and Scab to 2nd digit           Devices In Places: PIV    Interventions In Place Pressure Redistribution Mattress    Possible Skin Injury No    Pictures Uploaded Into Epic N/A  Wound Consult Placed N/A  RN Wound Prevention Protocol Ordered No

## 2021-11-14 NOTE — DISCHARGE SUMMARY
Discharge Summary    Date of Admission: 11/10/2021  Date of Discharge: 11/14/2021  Discharging Attending: Pedro Chatman M.D.   Discharging Senior Resident: Dr. Buchanan  Discharging Intern: Dr. Leary    CHIEF COMPLAINT ON ADMISSION  Chief Complaint   Patient presents with   • Near Syncopal     Pt BIB EMS as code Stemi       Reason for Admission  Weakness  Was found down    Admission Date  11/10/2021    CODE STATUS  Full Code    HPI & HOSPITAL COURSE  66-year-old male patient with past medical history of COPD, tobacco use, there is mellitus type II, and hypertension who was brought in by ambulance 11/10/2021 as he was found down by EMS at a gas station initially as STEMI code.  He was evaluated by cardiology and EDP who determined that ST changes on the EKG is nonspecific.  Upon presentation to the ER, the patient was febrile with 101F, heart rate 122.  Initial blood work-up was remarkable for white blood count of 23.6 with left shift, lactic acid of 6.3 and glucose of 332.  CT PE without evidence of PE however did show bilateral renal lesions indeterminate left adrenal nodule.  He was also found to be in new A. fib with RVR associated with right bundle branch.  Received only 1 dose p.o. metoprolol the patient was converted back to sinus rhythm.  He was started on antibiotics and IV fluids and was then admitted to medical floor for further management.  Upon further examination, the patient was found to have right lower leg cellulitis which was deemed to be the source of the infection.  Was started on Unasyn and continuous IV fluid.  Blood culture turn back positive for strep pyogenes, as kept on Unasyn waiting sensitivity. The right lower leg swelling significantly improved along with fever, chills and generalized malaise.  He needed oxygen therapy initially to maintain oxygen saturation above 90%.  However he has been completely weaned off afterwards.  Echocardiogram was obtained which showed normal left  ventricular systolic function with estimated ejection fraction of 55%, mild concentric left ventricular hypertrophy, moderately dilated right ventricle without any significant valvular abnormalities. Eventually, sensitivity came back and subsequently was switched to p.o. amoxicillin was sent home to complete 14 days course of antibiotics.  He was also started on anticoagulation (apixaban 5 mg twice daily) for high [id number] for A. fib.  [facility].  He also needs to follow-up with his PCP for getting follow-up imaging on renal and adrenal cysts    Therefore, he is discharged in good and stable condition to home with close outpatient follow-up.    The patient met 2-midnight criteria for an inpatient stay at the time of discharge.    PHYSICAL EXAM ON DISCHARGE  Temp:  [36.3 °C (97.4 °F)-37.1 °C (98.7 °F)] 36.3 °C (97.4 °F)  Pulse:  [60-82] 60  Resp:  [16-18] 16  BP: (114-150)/(67-97) 122/67  SpO2:  [90 %-93 %] 92 %    Physical Exam  Constitutional:       General: He is not in acute distress.     Appearance: He is obese. He is not ill-appearing, toxic-appearing or diaphoretic.   HENT:      Head: Normocephalic and atraumatic.      Nose: Nose normal.   Eyes:      Pupils: Pupils are equal, round, and reactive to light.   Cardiovascular:      Rate and Rhythm: Normal rate and regular rhythm.      Pulses: Normal pulses.      Heart sounds: No murmur heard.  No friction rub. No gallop.    Pulmonary:      Effort: No respiratory distress.      Breath sounds: Stridor present. No wheezing, rhonchi or rales.   Chest:      Chest wall: No tenderness.   Abdominal:      General: There is no distension.      Palpations: There is no mass.      Hernia: No hernia is present.   Skin:     Coloration: Skin is not jaundiced or pale.      Findings: No bruising or erythema.   Neurological:      General: No focal deficit present.      Mental Status: He is alert.      Cranial Nerves: No cranial nerve  deficit.      Sensory: No sensory deficit.      Motor: No weakness.      Coordination: Coordination normal.   Psychiatric:         Mood and Affect: Mood normal.         Discharge Date  11/14/2021    FOLLOW UP ITEMS POST DISCHARGE  Follow-up with the PCP    DISCHARGE DIAGNOSES  Principal Problem:    Bacteremia POA: Unknown  Active Problems:    COPD (chronic obstructive pulmonary disease) (HCC) POA: Unknown    Acute respiratory failure (HCC) POA: Unknown    Type 2 diabetes mellitus with hyperglycemia, without long-term current use of insulin (HCC) POA: Unknown    Adrenal cyst (HCC) POA: Unknown    Renal cyst POA: Unknown    Tobacco use disorder POA: Unknown    Cellulitis POA: Unknown  Resolved Problems:    * No resolved hospital problems. *      FOLLOW UP  No future appointments.      Call        MEDICATIONS ON DISCHARGE     Medication List      START taking these medications      Instructions   amoxicillin 875 MG tablet  Commonly known as: AMOXIL   Take 1 Tablet by mouth 2 times a day.  Dose: 875 mg     apixaban 5mg Tabs  Commonly known as: ELIQUIS   Take 1 Tablet by mouth 2 times a day.  Dose: 5 mg        CONTINUE taking these medications      Instructions   albuterol 108 (90 Base) MCG/ACT Aers inhalation aerosol   Inhale 2 Puffs every 6 hours as needed for Shortness of Breath.  Dose: 2 Puff     amLODIPine 10 MG Tabs  Commonly known as: NORVASC   Take 10 mg by mouth every day.  Dose: 10 mg     budesonide-formoterol 80-4.5 MCG/ACT Aero  Commonly known as: SYMBICORT   Inhale 2 Puffs 2 times a day.  Dose: 2 Puff     levothyroxine 200 MCG Tabs  Commonly known as: SYNTHROID   Take 200 mcg by mouth every morning on an empty stomach.  Dose: 200 mcg     metFORMIN 500 MG Tabs  Commonly known as: GLUCOPHAGE   Take 2,000 mg by mouth every evening.  Dose: 2,000 mg        STOP taking these medications    hydroCHLOROthiazide 12.5 MG tablet  Commonly known as: HYDRODIURIL     lisinopril 20 MG Tabs  Commonly known as: PRINIVIL             Allergies  No Known Allergies    DIET  Orders Placed This Encounter   Procedures   • Diet Order Diet: Consistent CHO (Diabetic)     Standing Status:   Standing     Number of Occurrences:   1     Order Specific Question:   Diet:     Answer:   Consistent CHO (Diabetic) [4]       ACTIVITY  As tolerated.  Weight bearing as tolerated    CONSULTATIONS  n/a    PROCEDURES

## 2021-11-14 NOTE — PROGRESS NOTES
Pt discharged per physician order. Discharged to home. Follow up instructions reviewed. Pt instructed to call [phone] to set up PCP appointment with [facility].   To  meds from [facility] pharmacy.   O2>90 on RA.  Tolerating diet.   All questions answered, all belongings with pt at time of discharge.

## 2021-11-14 NOTE — PROGRESS NOTES
Report received from Dante JAIME. Awais RN and Tab JAIME picked up pt from T801. Pt now settled into S6-1. Pt is A&Ox4. Pt is resting in bed, VSS on RA. Call light & personal belongings within reach, bed in lowest position and locked.

## 2021-11-14 NOTE — PROGRESS NOTES
"Assessment complete, pt A&Ox4, irritable, lethargic.  RLE swelling and redness, bilateral knee abrasions. Pt denies loss of feeling in BLE, able to wiggle all toes.   Pt up self.  Tolerating diabetic diet  O2>90 on RA.  Pt c/o mild pain from \"laying in bed\".    Call bell within reach, bed locked in lowest position.    Covid 19 surge in effect.   "

## 2021-11-14 NOTE — DISCHARGE INSTRUCTIONS
Arrhythmia Categories  Your heart is a muscle that works to pump blood through your body by regular contractions. The beating of your heart is controlled by a system of special pacemaker cells. These cells control the electrical activity of the heart. When the system controlling this regular beating is disturbed, a heart rhythm abnormality (arrhythmia) results.  WHEN YOUR HEART SKIPS A BEAT  One of the most common and least serious heart arrhythmias is called an ectopic or premature atrial heartbeat (PAC). This may be noticed as a small change in your regular pulse. A PAC originates from the top part (atrium) of the heart. Within the right atrium, the SA node is the area that normally controls the regularity of the heart. PACs occur in heart tissue outside of the SA node region. You may feel this as a skipped beat or heart flutter, especially if several occur in succession or occur frequently.   Another arrhythmia is ventricular premature complex (VCP or PVC). These extra beats start out in the bottom, more muscular chambers of the heart. In most cases a PVC is harmless. If there are underlying causes that are making the heart irritable such as an overactive thyroid or a prior heart attack PVCs may be of more concern. In a few cases, medications to control the heart rhythm may be prescribed.  Things to try at home:  · Cut down or avoid alcohol, tobacco and caffeine.   · Get enough sleep.   · Reduce stress.   · Exercise more.   WHEN THE HEART BEATS TOO FAST  Atrial tachycardia is a fast heart rate, which starts out in the atrium. It may last from minutes to much longer. Your heart may beat 140 to 240 times per minute instead of the normal 60 to 100.  · Symptoms include a worried feeling (anxiety) and a sense that your heart is beating fast and hard.   · You may be able to stop the fast rate by holding your breath or bearing down as if you were going to have a bowel movement.   · This type of fast rate is usually  not dangerous.   Atrial fibrillation and atrial flutter are other fast rhythms that start in the atria. Both conditions keep the atria from filling with enough blood so the heart does not work well.  · Symptoms include feeling lightheaded or faint.   · These fast rates may be the result of heart damage or disease. Too much thyroid hormone may play a role.   · There may be no clear cause or it may be from heart disease or damage.   · Medication or a special electrical treatment (cardioversion) may be needed to get the heart beating normally.   Ventricular tachycardia is a fast heart rate that starts in the lower muscular chambers (ventricles) This is a serious disorder that requires treatment as soon as possible. You need someone else to get and use a small defibrillator.  · Symptoms include collapse, chest pain, or being short of breath.   · Treatment may include medication, procedures to improve blood flow to the heart, or an implantable cardiac defibrillator (ICD).   DIAGNOSIS   · A cardiogram (EKG or ECG) will be done to see the arrhythmia, as well as lab tests to check the underlying cause.   · If the extra beats or fast rate come and go, you may wear a Holter monitor that records your heart rate for a longer period of time.   HOME CARE INSTRUCTIONS   If your caregiver has given you a follow-up appointment, it is very important to keep that appointment. Not keeping the appointment could result in a heart attack, permanent injury, pain, and disability. If there is any problem keeping the appointment, you must call back to this facility for assistance.   SEEK MEDICAL CARE IF:  · You have irregular or fast heartbeats (palpitations).   · You experience skipped beats.   · You develop lightheadedness.   · You have chest discomfort.   · You develop shortness of breath.   · You have more frequent episodes, if you are already being treated.   SEEK IMMEDIATE MEDICAL CARE IF:   · You have severe chest pain, especially if  the pain is crushing or pressure-like and spreads to the arms, back, neck, or jaw, or if you have sweating, feeling sick to your stomach (nausea), or shortness of breath. THIS IS AN EMERGENCY. Do not wait to see if the pain will go away. Get medical help at once. Call 911 or 0 (). DO NOT drive yourself to the hospital.   · You feel dizzy or faint.   · You have episodes of previously documented atrial tachycardia that do not resolve with the techniques your caregiver has taught you.   · Irregular or rapid heartbeats begin to occur more often than in the past, especially if they are associated with more pronounced symptoms or of longer duration.   Document Released: 12/18/2006 Document Revised: 03/11/2013 Document Reviewed: 05/01/2008  [employer] Patient Information ©2013 [employer].    Bacteremia, Adult  Bacteremia is the presence of bacteria in the blood. When bacteria enter the bloodstream, they can cause a life-threatening reaction called sepsis, which is a medical emergency. Bacteremia can spread to other parts of the body, including the heart, joints, and brain.  What are the causes?  This condition is caused by bacteria that get into the blood.  · Bacteria can enter the blood:  ? From a skin infection or injury, such as a burn or a cut.  ? From a lung infection (pneumonia).  ? From an infection in your stomach or intestines (gastrointestinal infection).  ? From an infection in your bladder or urinary system (urinary tract infection).  ? During a dental or medical procedure.  ? From bleeding gums.  ? When a bacterial infection in another part of your body spreads to your blood.  ? Through an unclean (contaminated) needle.  What increases the risk?  This condition is more likely to develop in children, the elderly, and people who:  · Have a long-term (chronic) disease or condition like diabetes or chronic kidney failure.  · Have an artificial joint or heart valve.  · Have heart valve disease.  · Have a  tube inserted to treat a medical condition, such as a urinary catheter or IV.  · Have a weak disease-fighting system (immune system).  · Inject illegal drugs.  · Have been hospitalized for more than 10 days in a row.  What are the signs or symptoms?  Symptoms of this condition include:  · Fever.  · Chills.  · Fast heartbeat.  · Shortness of breath.  · Dizziness.  · Weakness.  · Confusion.  · Nausea or vomiting.  · Diarrhea.  · Low blood pressure.  · Decreased urine output.  Bacteremia that has spread to other parts of the body may cause symptoms in those areas. In some cases, there are no symptoms.  How is this diagnosed?  This condition may be diagnosed with a physical exam and tests, such as:  · A complete blood count (CBC). This test checks for signs of infection.  · Blood cultures. These check for bacteria in your blood.  · Tests of any tubes that you have had inserted. These tests check for a source of infection.  · Urine tests, including urine cultures. These check for bacteria in the urine that could be a source of infection.  · Imaging tests, such as an X-ray, CT scan, MRI, or heart ultrasound. These check for a source of infection in other parts of your body, such as your lungs, heart valves, or joints.  How is this treated?  This condition is usually treated in the hospital. Treatment may involve:  · Antibiotic medicines. These may be given by mouth (orally) or directly into your blood through an IV (infusion through your vein).  ? Depending on the source of infection, you may need antibiotics for several weeks.  ? At first, you may be given an antibiotic to kill most types of blood bacteria (broad-spectrum antibiotic). If your test results show that a certain kind of bacteria is causing the problem, you may be given a different antibiotic to kill that specific bacteria.  · IV fluids.  · Removing any catheter or device that could be a source of infection.  · Blood pressure and breathing support, if  needed.  · Surgery to control the source or the spread of infection, such as surgery to remove an infected device, abscess, or tissue.  · Having follow-up visits for medicines, blood tests, and further evaluation.  Follow these instructions at home:  Medicines  · Take over-the-counter and prescription medicines only as told by your health care provider.  · If you were prescribed an antibiotic medicine, take it as told by your health care provider. Do not stop taking the antibiotic even if you start to feel better.  General instructions    · Rest as needed. Ask your health care provider when you may return to normal activities.  · Drink enough fluid to keep your urine pale yellow.  · Do not use any products that contain nicotine or tobacco, such as cigarettes and e-cigarettes. If you need help quitting, ask your health care provider.  · Keep all follow-up visits as told by your health care provider. This is important.  How is this prevented?    · Wash your hands regularly with soap and water. If soap and water are not available, use hand .  · You should wash your hands:  ? After using the toilet or changing a diaper.  ? Before preparing, cooking, or serving food.  ? While caring for a sick person or while visiting someone in a hospital.  ? Before and after changing bandages (dressings) over wounds.  · Clean any scrapes or cuts with soap and water and cover them with clean dressings.  · Get vaccinations as recommended by your health care provider.  · Practice good oral hygiene. Brush your teeth two times a day, and floss regularly.  · Take good care of your skin. This includes bathing and moisturizing on a regular basis.  Get help right away if you have:  · Pain.  · A fever or chills.  · Trouble breathing.  · A fast heart rate.  · Skin that is blotchy, pale, or clammy.  · Confusion.  · Weakness.  · Lack of energy (lethargy) or unusual sleepiness.  · Diarrhea.  · New symptoms that develop after treatment has  started.  These symptoms may represent a serious problem that is an emergency. Do not wait to see if the symptoms will go away. Get medical help right away. Call your local emergency services (911 in the [place] Do not drive yourself to the hospital.  Summary  · Bacteremia is the presence of bacteria in the blood. When bacteria enter the bloodstream, they can cause a life-threatening reaction called sepsis.  · Some symptoms of bacteremia include fever, chills, shortness of breath, confusion, nausea or vomiting, and diarrhea.  · Tests may be done to find the source of infection that led to bacteremia. These tests may include blood tests, urine tests, and imaging tests.  · Bacteremia is usually treated with antibiotic medicines in the hospital.  · Get help right away if you have any new symptoms that develop after treatment has started.  This information is not intended to replace advice given to you by your health care provider. Make sure you discuss any questions you have with your health care provider.  Document Released: 10/01/2007 Document Revised: 04/29/2019 Document Reviewed: 04/29/2019  [employer] Patient Education © 2020 [employer]     Cellulitis, Adult    Cellulitis is a skin infection. The infected area is often warm, red, swollen, and sore. It occurs most often in the arms and lower legs. It is very important to get treated for this condition.  What are the causes?  This condition is caused by bacteria. The bacteria enter through a break in the skin, such as a cut, burn, insect bite, open sore, or crack.  What increases the risk?  This condition is more likely to occur in people who:  · Have a weak body defense system (immune system).  · Have open cuts, burns, bites, or scrapes on the skin.  · Are older than 60 years of age.  · Have a blood sugar problem (diabetes).  · Have a long-lasting (chronic) liver disease (cirrhosis) or kidney disease.  · Are very overweight (obese).  · Have a skin problem, such  as:  ? Itchy rash (eczema).  ? Slow movement of blood in the veins (venous stasis).  ? Fluid buildup below the skin (edema).  · Have been treated with high-energy rays (radiation).  · Use IV drugs.  What are the signs or symptoms?  Symptoms of this condition include:  · Skin that is:  ? Red.  ? Streaking.  ? Spotting.  ? Swollen.  ? Sore or painful when you touch it.  ? Warm.  · A fever.  · Chills.  · Blisters.  How is this diagnosed?  This condition is diagnosed based on:  · Medical history.  · Physical exam.  · Blood tests.  · Imaging tests.  How is this treated?  Treatment for this condition may include:  · Medicines to treat infections or allergies.  · Home care, such as:  ? Rest.  ? Placing cold or warm cloths (compresses) on the skin.  · Hospital care, if the condition is very bad.  Follow these instructions at home:  Medicines  · Take over-the-counter and prescription medicines only as told by your doctor.  · If you were prescribed an antibiotic medicine, take it as told by your doctor. Do not stop taking it even if you start to feel better.  General instructions    · Drink enough fluid to keep your pee (urine) pale yellow.  · Do not touch or rub the infected area.  · Raise (elevate) the infected area above the level of your heart while you are sitting or lying down.  · Place cold or warm cloths on the area as told by your doctor.  · Keep all follow-up visits as told by your doctor. This is important.  Contact a doctor if:  · You have a fever.  · You do not start to get better after 1-2 days of treatment.  · Your bone or joint under the infected area starts to hurt after the skin has healed.  · Your infection comes back. This can happen in the same area or another area.  · You have a swollen bump in the area.  · You have new symptoms.  · You feel ill and have muscle aches and pains.  Get help right away if:  · Your symptoms get worse.  · You feel very sleepy.  · You throw up (vomit) or have watery poop  (diarrhea) for a long time.  · You see red streaks coming from the area.  · Your red area gets larger.  · Your red area turns dark in color.  These symptoms may represent a serious problem that is an emergency. Do not wait to see if the symptoms will go away. Get medical help right away. Call your local emergency services (911 in the [place] Do not drive yourself to the hospital.  Summary  · Cellulitis is a skin infection. The area is often warm, red, swollen, and sore.  · This condition is treated with medicines, rest, and cold and warm cloths.  · Take all medicines only as told by your doctor.  · Tell your doctor if symptoms do not start to get better after 1-2 days of treatment.  This information is not intended to replace advice given to you by your health care provider. Make sure you discuss any questions you have with your health care provider.  Document Released: 06/05/2009 Document Revised: 05/09/2019 Document Reviewed: 05/09/2019  [employer] Patient Education © 2020 [employer]                                Please follow-up with your primary care physician in 1 week to discuss your diabetes, to make sure your infection has passed,, and to discuss the findings of your renal cysts and adrenal nodules.  -Please follow-up with your primary care doctor regarding your atrial fibrillation, please take apixaban (blood thinner) twice a day to help reduce the risk of stroke.  -If your blood pressure starts to become high you can talk to your doctor about adding on your home lisinopril and hydrochlorothiazide, just avoid taking if you feel dizzy or lightheaded, these will likely need to be added on once the infection has passed and you have finished treatment.      Discharge Instructions    Discharged to home by car with relative. Discharged via wheelchair, hospital escort: Refused.  Special equipment needed: Not Applicable    Be sure to schedule a follow-up appointment with your primary care doctor or any specialists  as instructed.     Discharge Plan:   Influenza Vaccine Indication: Patient Refuses    I understand that a diet low in cholesterol, fat, and sodium is recommended for good health. Unless I have been given specific instructions below for another diet, I accept this instruction as my diet prescription.   Other diet: diabetic    Special Instructions: None    · Is patient discharged on Warfarin / Coumadin?   No     Depression / Suicide Risk    As you are discharged from this [facility] facility, it is important to learn how to keep safe from harming yourself.    Recognize the warning signs:  · Abrupt changes in personality, positive or negative- including increase in energy   · Giving away possessions  · Change in eating patterns- significant weight changes-  positive or negative  · Change in sleeping patterns- unable to sleep or sleeping all the time   · Unwillingness or inability to communicate  · Depression  · Unusual sadness, discouragement and loneliness  · Talk of wanting to die  · Neglect of personal appearance   · Rebelliousness- reckless behavior  · Withdrawal from people/activities they love  · Confusion- inability to concentrate     If you or a loved one observes any of these behaviors or has concerns about self-harm, here's what you can do:  · Talk about it- your feelings and reasons for harming yourself  · Remove any means that you might use to hurt yourself (examples: pills, rope, extension cords, firearm)  · Get professional help from the community (Mental Health, Substance Abuse, psychological counseling)  · Do not be alone:Call your Safe Contact- someone whom you trust who will be there for you.  · Call your local CRISIS HOTLINE 622-2685 or 495-778-4193  · Call your local Children's Mobile Crisis Response Team [place] (000) 846-8954 or www.[employer]  · Call the toll free National Suicide Prevention Hotlines   · National Suicide Prevention Lifeline 965-751-UJSR (9104)  · National Hope Line  Network 800-SUICIDE (611-0416)

## 2021-11-15 LAB
BACTERIA BLD CULT: NORMAL
SIGNIFICANT IND 70042: NORMAL
SITE SITE: NORMAL
SOURCE SOURCE: NORMAL

## 2021-11-17 LAB
BACTERIA BLD CULT: NORMAL
BACTERIA BLD CULT: NORMAL
SIGNIFICANT IND 70042: NORMAL
SIGNIFICANT IND 70042: NORMAL
SITE SITE: NORMAL
SITE SITE: NORMAL
SOURCE SOURCE: NORMAL
SOURCE SOURCE: NORMAL

## 2024-05-06 ENCOUNTER — APPOINTMENT (OUTPATIENT)
Dept: LAB | Facility: MEDICAL CENTER | Age: 69
End: 2024-05-06

## 2025-06-14 ENCOUNTER — OFFICE VISIT (OUTPATIENT)
Dept: URGENT CARE | Facility: PHYSICIAN GROUP | Age: 70
End: 2025-06-14
Payer: COMMERCIAL

## 2025-06-14 VITALS
RESPIRATION RATE: 14 BRPM | TEMPERATURE: 97.7 F | OXYGEN SATURATION: 91 % | BODY MASS INDEX: 41.16 KG/M2 | HEART RATE: 90 BPM | HEIGHT: 71 IN | DIASTOLIC BLOOD PRESSURE: 64 MMHG | WEIGHT: 294 LBS | SYSTOLIC BLOOD PRESSURE: 140 MMHG

## 2025-06-14 DIAGNOSIS — H10.9 BACTERIAL CONJUNCTIVITIS OF LEFT EYE: Primary | ICD-10-CM

## 2025-06-14 PROCEDURE — 3078F DIAST BP <80 MM HG: CPT

## 2025-06-14 PROCEDURE — 3077F SYST BP >= 140 MM HG: CPT

## 2025-06-14 PROCEDURE — 99203 OFFICE O/P NEW LOW 30 MIN: CPT

## 2025-06-14 RX ORDER — POLYMYXIN B SULFATE AND TRIMETHOPRIM 1; 10000 MG/ML; [USP'U]/ML
1 SOLUTION OPHTHALMIC EVERY 4 HOURS
Qty: 10 ML | Refills: 0 | Status: SHIPPED | OUTPATIENT
Start: 2025-06-14 | End: 2025-06-21

## 2025-06-14 ASSESSMENT — ENCOUNTER SYMPTOMS
EYE REDNESS: 1
DOUBLE VISION: 0
EYE DISCHARGE: 1
PHOTOPHOBIA: 0
BLURRED VISION: 0